# Patient Record
Sex: MALE | Race: WHITE | NOT HISPANIC OR LATINO | URBAN - METROPOLITAN AREA
[De-identification: names, ages, dates, MRNs, and addresses within clinical notes are randomized per-mention and may not be internally consistent; named-entity substitution may affect disease eponyms.]

---

## 2017-05-12 ENCOUNTER — COMMUNICATION - HEALTHEAST (OUTPATIENT)
Dept: CARDIOLOGY | Facility: CLINIC | Age: 82
End: 2017-05-12

## 2017-05-12 DIAGNOSIS — I34.1 MITRAL PROLAPSE: ICD-10-CM

## 2017-05-22 ENCOUNTER — OFFICE VISIT - HEALTHEAST (OUTPATIENT)
Dept: FAMILY MEDICINE | Facility: CLINIC | Age: 82
End: 2017-05-22

## 2017-05-22 DIAGNOSIS — A31.9 MYCOBACTERIUM INFECTIONS, ATYPICAL: ICD-10-CM

## 2017-05-22 DIAGNOSIS — R04.2 HEMOPTYSIS: ICD-10-CM

## 2017-05-22 DIAGNOSIS — I10 HTN (HYPERTENSION): ICD-10-CM

## 2017-05-23 ENCOUNTER — HOSPITAL ENCOUNTER (OUTPATIENT)
Dept: CT IMAGING | Facility: CLINIC | Age: 82
Discharge: HOME | End: 2017-05-23
Attending: FAMILY MEDICINE

## 2017-05-23 ENCOUNTER — COMMUNICATION - HEALTHEAST (OUTPATIENT)
Dept: FAMILY MEDICINE | Facility: CLINIC | Age: 82
End: 2017-05-23

## 2017-05-23 DIAGNOSIS — R04.2 HEMOPTYSIS: ICD-10-CM

## 2017-06-07 ENCOUNTER — OFFICE VISIT - HEALTHEAST (OUTPATIENT)
Dept: CARDIOLOGY | Facility: CLINIC | Age: 82
End: 2017-06-07

## 2017-06-07 ENCOUNTER — AMBULATORY - HEALTHEAST (OUTPATIENT)
Dept: CARDIOLOGY | Facility: CLINIC | Age: 82
End: 2017-06-07

## 2017-06-07 DIAGNOSIS — I34.1 MVP (MITRAL VALVE PROLAPSE): ICD-10-CM

## 2017-06-07 DIAGNOSIS — I34.0 NON-RHEUMATIC MITRAL REGURGITATION: ICD-10-CM

## 2017-06-07 DIAGNOSIS — I49.1 RUN OF ATRIAL PREMATURE COMPLEXES: ICD-10-CM

## 2017-06-07 LAB
ATRIAL RATE - MUSE: 64 BPM
DIASTOLIC BLOOD PRESSURE - MUSE: NORMAL MMHG
INTERPRETATION ECG - MUSE: NORMAL
P AXIS - MUSE: 92 DEGREES
PR INTERVAL - MUSE: 158 MS
QRS DURATION - MUSE: 98 MS
QT - MUSE: 436 MS
QTC - MUSE: 449 MS
R AXIS - MUSE: 80 DEGREES
SYSTOLIC BLOOD PRESSURE - MUSE: NORMAL MMHG
T AXIS - MUSE: 13 DEGREES
VENTRICULAR RATE- MUSE: 64 BPM

## 2017-06-07 ASSESSMENT — MIFFLIN-ST. JEOR: SCORE: 1086.71

## 2017-06-14 ENCOUNTER — OFFICE VISIT - HEALTHEAST (OUTPATIENT)
Dept: INFECTIOUS DISEASES | Facility: CLINIC | Age: 82
End: 2017-06-14

## 2017-06-14 DIAGNOSIS — J47.9 BRONCHIECTASIS WITHOUT COMPLICATION (H): ICD-10-CM

## 2017-06-14 DIAGNOSIS — J31.0 OTHER RHINITIS: ICD-10-CM

## 2017-06-14 DIAGNOSIS — I34.0 NON-RHEUMATIC MITRAL REGURGITATION: ICD-10-CM

## 2017-06-16 ENCOUNTER — AMBULATORY - HEALTHEAST (OUTPATIENT)
Dept: LAB | Facility: CLINIC | Age: 82
End: 2017-06-16

## 2017-06-16 ENCOUNTER — HOSPITAL ENCOUNTER (OUTPATIENT)
Dept: CARDIOLOGY | Facility: CLINIC | Age: 82
Discharge: HOME | End: 2017-06-16
Attending: INTERNAL MEDICINE

## 2017-06-16 DIAGNOSIS — I34.1 MVP (MITRAL VALVE PROLAPSE): ICD-10-CM

## 2017-06-16 DIAGNOSIS — J47.9 BRONCHIECTASIS WITHOUT COMPLICATION (H): ICD-10-CM

## 2017-06-16 DIAGNOSIS — I34.0 NON-RHEUMATIC MITRAL REGURGITATION: ICD-10-CM

## 2017-06-16 LAB
AORTIC ROOT: 3.5 CM
AORTIC VALVE MEAN VELOCITY: 123 CM/S
AV DIMENSIONLESS INDEX VTI: 0.3
AV MEAN GRADIENT: 8 MMHG
AV PEAK GRADIENT: 13.7 MMHG
AV VALVE AREA: 1.2 CM2
AV VELOCITY RATIO: 0.4
BSA FOR ECHO PROCEDURE: 1.54 M2
CV BLOOD PRESSURE: NORMAL MMHG
CV ECHO HEIGHT: 66 IN
CV ECHO WEIGHT: 113 LBS
DOP CALC AO PEAK VEL: 185 CM/S
DOP CALC AO VTI: 44.8 CM
DOP CALC LVOT AREA: 3.8 CM2
DOP CALC LVOT DIAMETER: 2.2 CM
DOP CALC LVOT PEAK VEL: 65.2 CM/S
DOP CALC LVOT STROKE VOLUME: 51.7 CM3
DOP CALCLVOT PEAK VEL VTI: 13.6 CM
EJECTION FRACTION: 51 % (ref 55–75)
FRACTIONAL SHORTENING: 30.9 % (ref 28–44)
INTERVENTRICULAR SEPTUM IN END DIASTOLE: 1.53 CM (ref 0.6–1)
IVS/PW RATIO: 1.1
LA AREA 1: 15.9 CM2
LA AREA 2: 20.1 CM2
LEFT ATRIUM LENGTH: 4.9 CM
LEFT ATRIUM SIZE: 3.2 CM
LEFT ATRIUM VOLUME INDEX: 36 ML/M2
LEFT ATRIUM VOLUME: 55.4 CM3
LEFT VENTRICLE CARDIAC INDEX: 2.3 L/MIN/M2
LEFT VENTRICLE CARDIAC OUTPUT: 3.5 L/MIN
LEFT VENTRICLE DIASTOLIC VOLUME INDEX: 33.1 CM3/M2 (ref 34–74)
LEFT VENTRICLE DIASTOLIC VOLUME: 51 CM3 (ref 62–150)
LEFT VENTRICLE HEART RATE: 68 BPM
LEFT VENTRICLE MASS INDEX: 140 G/M2
LEFT VENTRICLE SYSTOLIC VOLUME INDEX: 16.2 CM3/M2 (ref 11–31)
LEFT VENTRICLE SYSTOLIC VOLUME: 25 CM3 (ref 21–61)
LEFT VENTRICULAR INTERNAL DIMENSION IN DIASTOLE: 3.92 CM (ref 4.2–5.8)
LEFT VENTRICULAR INTERNAL DIMENSION IN SYSTOLE: 2.71 CM (ref 2.5–4)
LEFT VENTRICULAR MASS: 215.6 G
LEFT VENTRICULAR OUTFLOW TRACT MEAN GRADIENT: 1 MMHG
LEFT VENTRICULAR OUTFLOW TRACT MEAN VELOCITY: 38.9 CM/S
LEFT VENTRICULAR OUTFLOW TRACT PEAK GRADIENT: 2 MMHG
LEFT VENTRICULAR POSTERIOR WALL IN END DIASTOLE: 1.38 CM (ref 0.6–1)
LV STROKE VOLUME INDEX: 33.6 ML/M2
MITRAL REGURGITANT VELOCITY TIME INTEGRAL: 224 CM
MITRAL VALVE E/A RATIO: 1.5
MR MEAN GRADIENT: 93 MMHG
MR MEAN VELOCITY: 455 CM/S
MR PEAK GRADIENT: 136.9 MMHG
MR PISA RADIUS: 1.2 CM
MV AVERAGE E/E' RATIO: 17.5 CM/S
MV DECELERATION TIME: 141 MS
MV E'TISSUE VEL-LAT: 7.7 CM/S
MV E'TISSUE VEL-MED: 5.07 CM/S
MV LATERAL E/E' RATIO: 14.5
MV MEDIAL E/E' RATIO: 22.1
MV PEAK A VELOCITY: 74.9 CM/S
MV PEAK E VELOCITY: 112 CM/S
NUC REST DIASTOLIC VOLUME INDEX: 1808 LBS
NUC REST SYSTOLIC VOLUME INDEX: 66 IN
PISA MR PEAK VEL: 585 CM/S
PR MAX PG: 4 MMHG
PR PEAK VELOCITY: 119 CM/S
TRICUSPID REGURGITATION PEAK PRESSURE GRADIENT: 25.4 MMHG
TRICUSPID VALVE PEAK REGURGITANT VELOCITY: 252 CM/S

## 2017-06-16 ASSESSMENT — MIFFLIN-ST. JEOR: SCORE: 1100.31

## 2017-06-17 ENCOUNTER — COMMUNICATION - HEALTHEAST (OUTPATIENT)
Dept: SCHEDULING | Facility: CLINIC | Age: 82
End: 2017-06-17

## 2017-07-14 LAB — SPECIMEN STATUS: NORMAL

## 2017-07-21 LAB — SPECIMEN STATUS: NORMAL

## 2017-07-25 ENCOUNTER — COMMUNICATION - HEALTHEAST (OUTPATIENT)
Dept: CARDIOLOGY | Facility: CLINIC | Age: 82
End: 2017-07-25

## 2017-07-25 DIAGNOSIS — I34.1 MITRAL PROLAPSE: ICD-10-CM

## 2017-08-02 ENCOUNTER — OFFICE VISIT - HEALTHEAST (OUTPATIENT)
Dept: INFECTIOUS DISEASES | Facility: CLINIC | Age: 82
End: 2017-08-02

## 2017-08-02 DIAGNOSIS — I34.0 MITRAL VALVE INSUFFICIENCY, UNSPECIFIED ETIOLOGY: ICD-10-CM

## 2017-08-02 DIAGNOSIS — A31.0 PULMONARY MYCOBACTERIUM AVIUM COMPLEX (MAC) INFECTION (H): ICD-10-CM

## 2017-08-18 ENCOUNTER — COMMUNICATION - HEALTHEAST (OUTPATIENT)
Dept: CARDIOLOGY | Facility: CLINIC | Age: 82
End: 2017-08-18

## 2017-08-18 DIAGNOSIS — I34.0 MITRAL REGURGITATION: ICD-10-CM

## 2017-08-28 ENCOUNTER — HOSPITAL ENCOUNTER (OUTPATIENT)
Dept: CT IMAGING | Facility: CLINIC | Age: 82
Discharge: HOME | End: 2017-08-28
Attending: INTERNAL MEDICINE

## 2017-08-28 DIAGNOSIS — A31.0 PULMONARY MYCOBACTERIUM AVIUM COMPLEX (MAC) INFECTION (H): ICD-10-CM

## 2017-09-11 ENCOUNTER — AMBULATORY - HEALTHEAST (OUTPATIENT)
Dept: INFECTIOUS DISEASES | Facility: CLINIC | Age: 82
End: 2017-09-11

## 2018-02-20 ENCOUNTER — COMMUNICATION - HEALTHEAST (OUTPATIENT)
Dept: CARDIOLOGY | Facility: CLINIC | Age: 83
End: 2018-02-20

## 2018-02-20 DIAGNOSIS — I34.0 MITRAL REGURGITATION: ICD-10-CM

## 2018-02-21 ENCOUNTER — AMBULATORY - HEALTHEAST (OUTPATIENT)
Dept: CARDIOLOGY | Facility: CLINIC | Age: 83
End: 2018-02-21

## 2018-02-21 DIAGNOSIS — I34.1 MITRAL REGURGITATION DUE TO CUSP PROLAPSE: ICD-10-CM

## 2018-02-21 DIAGNOSIS — I34.0 MITRAL REGURGITATION DUE TO CUSP PROLAPSE: ICD-10-CM

## 2018-02-21 DIAGNOSIS — I34.1 MVP (MITRAL VALVE PROLAPSE): ICD-10-CM

## 2018-02-21 DIAGNOSIS — I35.0 AORTIC STENOSIS, MILD: ICD-10-CM

## 2018-03-16 ENCOUNTER — RECORDS - HEALTHEAST (OUTPATIENT)
Dept: ADMINISTRATIVE | Facility: OTHER | Age: 83
End: 2018-03-16

## 2018-05-22 ENCOUNTER — AMBULATORY - HEALTHEAST (OUTPATIENT)
Dept: CARDIOLOGY | Facility: CLINIC | Age: 83
End: 2018-05-22

## 2018-05-22 ENCOUNTER — RECORDS - HEALTHEAST (OUTPATIENT)
Dept: ADMINISTRATIVE | Facility: OTHER | Age: 83
End: 2018-05-22

## 2018-06-01 ENCOUNTER — OFFICE VISIT - HEALTHEAST (OUTPATIENT)
Dept: CARDIOLOGY | Facility: CLINIC | Age: 83
End: 2018-06-01

## 2018-06-01 DIAGNOSIS — I34.1 MVP (MITRAL VALVE PROLAPSE): ICD-10-CM

## 2018-06-01 DIAGNOSIS — I49.1 PAC (PREMATURE ATRIAL CONTRACTION): ICD-10-CM

## 2018-06-01 DIAGNOSIS — I10 ESSENTIAL HYPERTENSION: ICD-10-CM

## 2018-06-01 DIAGNOSIS — I34.0 NON-RHEUMATIC MITRAL REGURGITATION: ICD-10-CM

## 2018-06-01 DIAGNOSIS — I34.1 MITRAL PROLAPSE: ICD-10-CM

## 2018-06-01 ASSESSMENT — MIFFLIN-ST. JEOR: SCORE: 1081.26

## 2019-01-01 ENCOUNTER — RECORDS - HEALTHEAST (OUTPATIENT)
Dept: LAB | Facility: CLINIC | Age: 84
End: 2019-01-01

## 2019-01-01 ENCOUNTER — OFFICE VISIT - HEALTHEAST (OUTPATIENT)
Dept: GERIATRICS | Facility: CLINIC | Age: 84
End: 2019-01-01

## 2019-01-01 ENCOUNTER — COMMUNICATION - HEALTHEAST (OUTPATIENT)
Dept: INFECTIOUS DISEASES | Facility: CLINIC | Age: 84
End: 2019-01-01

## 2019-01-01 ENCOUNTER — RECORDS - HEALTHEAST (OUTPATIENT)
Dept: ADMINISTRATIVE | Facility: OTHER | Age: 84
End: 2019-01-01

## 2019-01-01 ENCOUNTER — COMMUNICATION - HEALTHEAST (OUTPATIENT)
Dept: TELEHEALTH | Facility: CLINIC | Age: 84
End: 2019-01-01

## 2019-01-01 ENCOUNTER — AMBULATORY - HEALTHEAST (OUTPATIENT)
Dept: GERIATRICS | Facility: CLINIC | Age: 84
End: 2019-01-01

## 2019-01-01 ENCOUNTER — COMMUNICATION - HEALTHEAST (OUTPATIENT)
Dept: GERIATRICS | Facility: CLINIC | Age: 84
End: 2019-01-01

## 2019-01-01 ENCOUNTER — AMBULATORY - HEALTHEAST (OUTPATIENT)
Dept: CARDIOLOGY | Facility: CLINIC | Age: 84
End: 2019-01-01

## 2019-01-01 DIAGNOSIS — R63.0 ANOREXIA: ICD-10-CM

## 2019-01-01 DIAGNOSIS — I35.0 AORTIC STENOSIS, MILD: ICD-10-CM

## 2019-01-01 DIAGNOSIS — M79.18 MUSCULOSKELETAL PAIN: ICD-10-CM

## 2019-01-01 DIAGNOSIS — R63.4 WEIGHT LOSS: ICD-10-CM

## 2019-01-01 DIAGNOSIS — I34.1 MVP (MITRAL VALVE PROLAPSE): ICD-10-CM

## 2019-01-01 DIAGNOSIS — E53.8 VITAMIN B12 DEFICIENCY: ICD-10-CM

## 2019-01-01 DIAGNOSIS — A31.0 MAI (MYCOBACTERIUM AVIUM-INTRACELLULARE) (H): ICD-10-CM

## 2019-01-01 DIAGNOSIS — E86.1 HYPOTENSION DUE TO HYPOVOLEMIA: ICD-10-CM

## 2019-01-01 DIAGNOSIS — J47.1 BRONCHIECTASIS WITH ACUTE EXACERBATION (H): ICD-10-CM

## 2019-01-01 DIAGNOSIS — I10 ESSENTIAL HYPERTENSION: ICD-10-CM

## 2019-01-01 DIAGNOSIS — E44.0 MODERATE PROTEIN-CALORIE MALNUTRITION (H): ICD-10-CM

## 2019-01-01 DIAGNOSIS — I34.0 MITRAL VALVE INSUFFICIENCY, UNSPECIFIED ETIOLOGY: ICD-10-CM

## 2019-01-01 DIAGNOSIS — J47.1 BRONCHIECTASIS WITH (ACUTE) EXACERBATION (H): ICD-10-CM

## 2019-01-01 DIAGNOSIS — R53.1 WEAKNESS: ICD-10-CM

## 2019-01-01 DIAGNOSIS — R41.89 COGNITIVE IMPAIRMENT: ICD-10-CM

## 2019-01-01 DIAGNOSIS — J18.9 PNEUMONIA OF BOTH UPPER LOBES DUE TO INFECTIOUS ORGANISM: ICD-10-CM

## 2019-01-01 DIAGNOSIS — N32.81 OVERACTIVE BLADDER: ICD-10-CM

## 2019-01-01 DIAGNOSIS — J96.21 ACUTE AND CHRONIC RESPIRATORY FAILURE WITH HYPOXIA (H): ICD-10-CM

## 2019-01-01 DIAGNOSIS — R53.1 GENERAL WEAKNESS: ICD-10-CM

## 2019-01-01 DIAGNOSIS — M25.512 ACUTE PAIN OF LEFT SHOULDER: ICD-10-CM

## 2019-01-01 DIAGNOSIS — E86.0 DEHYDRATION: ICD-10-CM

## 2019-01-01 LAB
ALBUMIN UR-MCNC: NEGATIVE MG/DL
ALBUMIN UR-MCNC: NEGATIVE MG/DL
ANION GAP SERPL CALCULATED.3IONS-SCNC: 5 MMOL/L (ref 5–18)
APPEARANCE UR: CLEAR
APPEARANCE UR: CLEAR
BACTERIA SPEC CULT: NO GROWTH
BACTERIA SPEC CULT: NO GROWTH
BASOPHILS # BLD AUTO: 0 THOU/UL (ref 0–0.2)
BASOPHILS NFR BLD AUTO: 0 % (ref 0–2)
BILIRUB UR QL STRIP: NEGATIVE
BILIRUB UR QL STRIP: NEGATIVE
BUN SERPL-MCNC: 17 MG/DL (ref 8–28)
CALCIUM SERPL-MCNC: 8.8 MG/DL (ref 8.5–10.5)
CHLORIDE BLD-SCNC: 103 MMOL/L (ref 98–107)
CO2 SERPL-SCNC: 28 MMOL/L (ref 22–31)
COLOR UR AUTO: YELLOW
COLOR UR AUTO: YELLOW
CREAT SERPL-MCNC: 0.68 MG/DL (ref 0.7–1.3)
EOSINOPHIL # BLD AUTO: 0.3 THOU/UL (ref 0–0.4)
EOSINOPHIL NFR BLD AUTO: 4 % (ref 0–6)
ERYTHROCYTE [DISTWIDTH] IN BLOOD BY AUTOMATED COUNT: 15.6 % (ref 11–14.5)
GFR SERPL CREATININE-BSD FRML MDRD: >60 ML/MIN/1.73M2
GLUCOSE BLD-MCNC: 75 MG/DL (ref 70–125)
GLUCOSE UR STRIP-MCNC: NEGATIVE MG/DL
GLUCOSE UR STRIP-MCNC: NEGATIVE MG/DL
HCT VFR BLD AUTO: 33.9 % (ref 40–54)
HGB BLD-MCNC: 10.7 G/DL (ref 14–18)
HGB BLD-MCNC: 13.4 G/DL (ref 14–18)
HGB UR QL STRIP: NEGATIVE
HGB UR QL STRIP: NEGATIVE
KETONES UR STRIP-MCNC: NEGATIVE MG/DL
KETONES UR STRIP-MCNC: NEGATIVE MG/DL
LEUKOCYTE ESTERASE UR QL STRIP: NEGATIVE
LEUKOCYTE ESTERASE UR QL STRIP: NEGATIVE
LYMPHOCYTES # BLD AUTO: 1.2 THOU/UL (ref 0.8–4.4)
LYMPHOCYTES NFR BLD AUTO: 15 % (ref 20–40)
MCH RBC QN AUTO: 28.7 PG (ref 27–34)
MCHC RBC AUTO-ENTMCNC: 31.6 G/DL (ref 32–36)
MCV RBC AUTO: 91 FL (ref 80–100)
MONOCYTES # BLD AUTO: 0.6 THOU/UL (ref 0–0.9)
MONOCYTES NFR BLD AUTO: 8 % (ref 2–10)
NEUTROPHILS # BLD AUTO: 5.9 THOU/UL (ref 2–7.7)
NEUTROPHILS NFR BLD AUTO: 73 % (ref 50–70)
NITRATE UR QL: NEGATIVE
NITRATE UR QL: NEGATIVE
PH UR STRIP: 6.5 [PH] (ref 4.5–8)
PH UR STRIP: 7 [PH] (ref 4.5–8)
PLATELET # BLD AUTO: 239 THOU/UL (ref 140–440)
PMV BLD AUTO: 9.5 FL (ref 8.5–12.5)
POTASSIUM BLD-SCNC: 4.2 MMOL/L (ref 3.5–5)
PSA SERPL-MCNC: <0.1 NG/ML (ref 0–6.5)
RBC # BLD AUTO: 3.73 MILL/UL (ref 4.4–6.2)
SODIUM SERPL-SCNC: 136 MMOL/L (ref 136–145)
SP GR UR STRIP: 1.01 (ref 1–1.03)
SP GR UR STRIP: 1.02 (ref 1–1.03)
UROBILINOGEN UR STRIP-ACNC: NORMAL
UROBILINOGEN UR STRIP-ACNC: NORMAL
VIT B12 SERPL-MCNC: 657 PG/ML (ref 213–816)
WBC: 6.2 THOU/UL (ref 4–11)
WBC: 8.2 THOU/UL (ref 4–11)

## 2019-02-15 ENCOUNTER — RECORDS - HEALTHEAST (OUTPATIENT)
Dept: LAB | Facility: CLINIC | Age: 84
End: 2019-02-15

## 2019-02-15 LAB
ANION GAP SERPL CALCULATED.3IONS-SCNC: 8 MMOL/L (ref 5–18)
BUN SERPL-MCNC: 28 MG/DL (ref 8–28)
CALCIUM SERPL-MCNC: 9.3 MG/DL (ref 8.5–10.5)
CHLORIDE BLD-SCNC: 107 MMOL/L (ref 98–107)
CHOLEST SERPL-MCNC: 182 MG/DL
CO2 SERPL-SCNC: 28 MMOL/L (ref 22–31)
CREAT SERPL-MCNC: 0.82 MG/DL (ref 0.7–1.3)
FASTING STATUS PATIENT QL REPORTED: NO
GFR SERPL CREATININE-BSD FRML MDRD: >60 ML/MIN/1.73M2
GLUCOSE BLD-MCNC: 90 MG/DL (ref 70–125)
HDLC SERPL-MCNC: 73 MG/DL
LDLC SERPL CALC-MCNC: 96 MG/DL
POTASSIUM BLD-SCNC: 4.8 MMOL/L (ref 3.5–5)
SODIUM SERPL-SCNC: 143 MMOL/L (ref 136–145)
TRIGL SERPL-MCNC: 65 MG/DL
VIT B12 SERPL-MCNC: 546 PG/ML (ref 213–816)

## 2020-01-01 ENCOUNTER — HOME CARE/HOSPICE - HEALTHEAST (OUTPATIENT)
Dept: HOSPICE | Facility: HOSPICE | Age: 85
End: 2020-01-01

## 2020-01-01 ENCOUNTER — OFFICE VISIT - HEALTHEAST (OUTPATIENT)
Dept: GERIATRICS | Facility: CLINIC | Age: 85
End: 2020-01-01

## 2020-01-01 ENCOUNTER — COMMUNICATION - HEALTHEAST (OUTPATIENT)
Dept: GERIATRICS | Facility: CLINIC | Age: 85
End: 2020-01-01

## 2020-01-01 ENCOUNTER — AMBULATORY - HEALTHEAST (OUTPATIENT)
Dept: HOSPICE | Facility: HOSPICE | Age: 85
End: 2020-01-01

## 2020-01-01 DIAGNOSIS — A31.0 MAI (MYCOBACTERIUM AVIUM-INTRACELLULARE) (H): ICD-10-CM

## 2020-01-01 DIAGNOSIS — I34.1 MVP (MITRAL VALVE PROLAPSE): ICD-10-CM

## 2020-01-01 DIAGNOSIS — I34.0 MITRAL VALVE INSUFFICIENCY, UNSPECIFIED ETIOLOGY: ICD-10-CM

## 2020-01-01 DIAGNOSIS — L89.151 PRESSURE INJURY OF COCCYGEAL REGION, STAGE 1: ICD-10-CM

## 2020-01-01 DIAGNOSIS — J47.1 BRONCHIECTASIS WITH ACUTE EXACERBATION (H): ICD-10-CM

## 2020-01-01 DIAGNOSIS — E86.1 HYPOTENSION DUE TO HYPOVOLEMIA: ICD-10-CM

## 2020-01-01 DIAGNOSIS — E43 SEVERE PROTEIN-CALORIE MALNUTRITION (GOMEZ: LESS THAN 60% OF STANDARD WEIGHT) (H): ICD-10-CM

## 2020-01-01 DIAGNOSIS — A08.4 VIRAL GASTROENTERITIS: ICD-10-CM

## 2020-01-01 DIAGNOSIS — M79.18 MUSCULOSKELETAL PAIN: ICD-10-CM

## 2020-01-01 DIAGNOSIS — S56.219A: ICD-10-CM

## 2020-01-01 DIAGNOSIS — M53.3 COCCYX PAIN: ICD-10-CM

## 2020-01-01 DIAGNOSIS — S61.412A SKIN TEAR OF LEFT HAND WITHOUT COMPLICATION, INITIAL ENCOUNTER: ICD-10-CM

## 2020-01-01 DIAGNOSIS — R41.89 COGNITIVE DECLINE: ICD-10-CM
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
## 2020-01-01 DIAGNOSIS — G30.1 LATE ONSET ALZHEIMER'S DISEASE WITHOUT BEHAVIORAL DISTURBANCE (H): ICD-10-CM

## 2020-01-01 DIAGNOSIS — I10 ESSENTIAL HYPERTENSION: ICD-10-CM

## 2020-01-01 DIAGNOSIS — R63.0 ANOREXIA: ICD-10-CM

## 2020-01-01 DIAGNOSIS — F02.80 LATE ONSET ALZHEIMER'S DISEASE WITHOUT BEHAVIORAL DISTURBANCE (H): ICD-10-CM

## 2020-01-01 DIAGNOSIS — S01.81XA FACIAL LACERATION, INITIAL ENCOUNTER: ICD-10-CM

## 2020-01-01 DIAGNOSIS — T30.0 FRICTION BURN: ICD-10-CM

## 2020-01-01 DIAGNOSIS — I35.0 AORTIC STENOSIS, MILD: ICD-10-CM

## 2020-01-01 DIAGNOSIS — R63.4 WEIGHT LOSS: ICD-10-CM

## 2020-01-01 DIAGNOSIS — E53.8 VITAMIN B12 DEFICIENCY: ICD-10-CM

## 2020-01-01 DIAGNOSIS — E86.0 DEHYDRATION: ICD-10-CM

## 2020-01-01 DIAGNOSIS — E44.0 MODERATE PROTEIN-CALORIE MALNUTRITION (H): ICD-10-CM

## 2020-01-01 DIAGNOSIS — N32.81 OVERACTIVE BLADDER: ICD-10-CM

## 2020-01-01 DIAGNOSIS — I34.1 MITRAL PROLAPSE: ICD-10-CM

## 2020-01-01 RX ORDER — LORAZEPAM 0.5 MG/1
0.5 TABLET ORAL EVERY 4 HOURS PRN
Status: SHIPPED | COMMUNITY
Start: 2020-01-01

## 2020-01-01 RX ORDER — HALOPERIDOL 2 MG/ML
1 SOLUTION ORAL EVERY 4 HOURS
Status: SHIPPED | COMMUNITY
Start: 2020-01-01

## 2020-01-01 RX ORDER — LORAZEPAM 2 MG/ML
0.5 CONCENTRATE ORAL EVERY 4 HOURS PRN
Status: SHIPPED | COMMUNITY
Start: 2020-01-01

## 2020-01-01 RX ORDER — BISACODYL 10 MG
10 SUPPOSITORY, RECTAL RECTAL DAILY PRN
Status: SHIPPED | COMMUNITY
Start: 2020-01-01

## 2020-01-01 RX ORDER — HYDROMORPHONE HYDROCHLORIDE 1 MG/ML
2 SOLUTION ORAL EVERY 4 HOURS
Status: SHIPPED | COMMUNITY
Start: 2020-01-01

## 2020-01-01 RX ORDER — HALOPERIDOL 2 MG/ML
2 SOLUTION ORAL
Status: SHIPPED | COMMUNITY
Start: 2020-01-01

## 2020-01-01 RX ORDER — HALOPERIDOL 2 MG/ML
1 SOLUTION ORAL
Status: SHIPPED | COMMUNITY
Start: 2020-01-01

## 2020-01-01 RX ORDER — ATROPINE SULFATE 10 MG/ML
2 SOLUTION/ DROPS OPHTHALMIC EVERY 4 HOURS PRN
Status: SHIPPED | COMMUNITY
Start: 2020-01-01

## 2020-01-01 RX ORDER — HYDROMORPHONE HYDROCHLORIDE 1 MG/ML
2 SOLUTION ORAL
Status: SHIPPED | COMMUNITY
Start: 2020-01-01

## 2020-01-01 RX ORDER — AMOXICILLIN 250 MG
1 CAPSULE ORAL DAILY
Status: SHIPPED | COMMUNITY
Start: 2020-01-01

## 2020-06-29 ENCOUNTER — HOME CARE/HOSPICE - HEALTHEAST (OUTPATIENT)
Dept: HOSPICE | Facility: HOSPICE | Age: 85
End: 2020-06-29

## 2020-07-29 ENCOUNTER — AMBULATORY - HEALTHEAST (OUTPATIENT)
Dept: GERIATRICS | Facility: CLINIC | Age: 85
End: 2020-07-29

## 2021-05-26 VITALS — OXYGEN SATURATION: 90 % | RESPIRATION RATE: 20 BRPM | HEART RATE: 56 BPM

## 2021-05-26 VITALS
OXYGEN SATURATION: 93 % | DIASTOLIC BLOOD PRESSURE: 62 MMHG | SYSTOLIC BLOOD PRESSURE: 98 MMHG | RESPIRATION RATE: 24 BRPM | HEART RATE: 72 BPM

## 2021-05-26 VITALS
HEART RATE: 66 BPM | DIASTOLIC BLOOD PRESSURE: 74 MMHG | RESPIRATION RATE: 16 BRPM | SYSTOLIC BLOOD PRESSURE: 109 MMHG | OXYGEN SATURATION: 94 %

## 2021-05-26 VITALS
DIASTOLIC BLOOD PRESSURE: 59 MMHG | OXYGEN SATURATION: 91 % | RESPIRATION RATE: 18 BRPM | SYSTOLIC BLOOD PRESSURE: 98 MMHG | HEART RATE: 69 BPM

## 2021-05-26 VITALS
TEMPERATURE: 96.5 F | RESPIRATION RATE: 16 BRPM | SYSTOLIC BLOOD PRESSURE: 107 MMHG | HEART RATE: 56 BPM | OXYGEN SATURATION: 96 % | DIASTOLIC BLOOD PRESSURE: 67 MMHG

## 2021-05-26 VITALS
HEART RATE: 82 BPM | RESPIRATION RATE: 16 BRPM | TEMPERATURE: 97 F | SYSTOLIC BLOOD PRESSURE: 110 MMHG | DIASTOLIC BLOOD PRESSURE: 70 MMHG

## 2021-05-27 ENCOUNTER — RECORDS - HEALTHEAST (OUTPATIENT)
Dept: ADMINISTRATIVE | Facility: CLINIC | Age: 86
End: 2021-05-27

## 2021-05-27 VITALS
DIASTOLIC BLOOD PRESSURE: 50 MMHG | HEART RATE: 93 BPM | RESPIRATION RATE: 24 BRPM | SYSTOLIC BLOOD PRESSURE: 80 MMHG | OXYGEN SATURATION: 94 %

## 2021-05-27 VITALS
HEART RATE: 66 BPM | DIASTOLIC BLOOD PRESSURE: 63 MMHG | SYSTOLIC BLOOD PRESSURE: 103 MMHG | RESPIRATION RATE: 16 BRPM | OXYGEN SATURATION: 90 %

## 2021-05-27 VITALS
DIASTOLIC BLOOD PRESSURE: 66 MMHG | SYSTOLIC BLOOD PRESSURE: 120 MMHG | OXYGEN SATURATION: 95 % | HEART RATE: 59 BPM | RESPIRATION RATE: 13 BRPM | TEMPERATURE: 97.1 F

## 2021-05-27 VITALS — SYSTOLIC BLOOD PRESSURE: 82 MMHG | RESPIRATION RATE: 20 BRPM | DIASTOLIC BLOOD PRESSURE: 50 MMHG | HEART RATE: 108 BPM

## 2021-05-27 VITALS
HEART RATE: 88 BPM | DIASTOLIC BLOOD PRESSURE: 82 MMHG | OXYGEN SATURATION: 90 % | RESPIRATION RATE: 24 BRPM | SYSTOLIC BLOOD PRESSURE: 118 MMHG

## 2021-05-27 VITALS
DIASTOLIC BLOOD PRESSURE: 56 MMHG | OXYGEN SATURATION: 96 % | HEART RATE: 123 BPM | SYSTOLIC BLOOD PRESSURE: 92 MMHG | RESPIRATION RATE: 16 BRPM

## 2021-05-27 VITALS
RESPIRATION RATE: 16 BRPM | DIASTOLIC BLOOD PRESSURE: 70 MMHG | HEART RATE: 82 BPM | OXYGEN SATURATION: 93 % | SYSTOLIC BLOOD PRESSURE: 110 MMHG

## 2021-05-30 ENCOUNTER — RECORDS - HEALTHEAST (OUTPATIENT)
Dept: ADMINISTRATIVE | Facility: CLINIC | Age: 86
End: 2021-05-30

## 2021-05-30 NOTE — PROGRESS NOTES
[facility]    DATE: 2019    NAME:  Sonu Solomon             :  1927  MRN: [id number]  CODE STATUS:  DNR/DNI    VISIT TYPE: Discharge Summary     FACILITY:  [facility] [[id number]]       CHIEF COMPLAIN/REASON FOR VISIT:    Chief Complaint   Patient presents with     Discharge Summary               HISTORY OF PRESENT ILLNESS: Sonu Solomon is a 89 y.o. male who was admitted to [facility] 7/3- for worsening shortness of breath. CT showed debris in left mainstem bronchus, bronchi to left lower lobe, new airspace disease in both upper lobes. He has history of chronic DAVID infection/nodular bronchiectasis. He was treated for pneumonia and exacerbation. He was weaned from oxygen by discharge and was treated with azithromycin and omnicef. He had some delirium during stay and it was suspected he had underlying dementia. His slums score in hospital was 19. He had preivously been struggling at home to remember to eat and had lost a significant amoutn of weight. He was driving but would frequently get lost. He was discharged to [facility]. He transferred to [facility] on 7/10. He has PMH of HTN, chronic DAVID infection, severe mitral valve prolapse, regurgitation, hyponatremia, aortic stenosis. Prior to this he lived at home and was still driving.     TCU course:   Mr. Solomon has made progress with therapy and is ambulating 400 feet with no assistive device. He scored 39/56 on gtaes so does have safety concerns. He scored 14/30 on slums so significant cognitive impairment and 20/30 on MOCA. He completed cefdinir on  and had resolution of cough, shortness of breath at the time. His vitals were stable and no orthostasis in therapy noted. His cardiac appointment for follow up on mitral valve was cancelled and will be rescheduled after discharges from TCU. He had issues with overactive bladder symptoms and Urine culture was negative. He was  started on oxybutynin and dose increased to 10mg and now is much improved. His b12 level was stable as were labs. He did have some chest wall musculoskeletal pain that was resolved with therapy, ice, tylenol. His weights had dropped at home previously due to forgetting to eat but now he is back up to 109lbs. He will not be returning to his [facility] but will be moving to [facility]. He was recommended to stop driving. He will f/u with PCP or in house provider in one week from discharge. He will be moving over there on 7/23.       REVIEW OF SYSTEMS:  PROBLEMS AND REVIEW OF SYSTEMS:   Today on ROS:   Currently, no fever, chills, or rigors. Decreased vision, hearing intact. Denies any chest pain, headaches, palpitations, lightheadedness, dizziness, shortness of breath. Appetite is good. Denies any GERD symptoms. Denies any difficulty with swallowing, nausea, or vomiting.  Denies any abdominal pain, diarrhea or constipation. No insomnia. No active bleeding. No rash. Positive for forgetfulness, urinary frequency, nocturia, urgency, incontinence-improving    Allergies   Allergen Reactions     Hydrocodone-Acetaminophen Nausea Only     Tree And Shrub Pollen Other (See Comments)     Sneezing from evergreen tree pollen in winter.     Current Outpatient Medications   Medication Sig     acetaminophen (TYLENOL) 500 MG tablet Take 1,000 mg by mouth 3 (three) times a day as needed for pain.     aspirin 81 mg chewable tablet Chew 81 mg daily.     carvedilol (COREG) 6.25 MG tablet Take 1 tablet (6.25 mg total) by mouth 2 (two) times a day with meals.     cyanocobalamin (VITAMIN B-12) 500 MCG tablet Take 500 mcg by mouth daily.     lisinopril (PRINIVIL,ZESTRIL) 2.5 MG tablet Take 1 tablet (2.5 mg total) by mouth daily.     multivitamin with minerals (THERA-M) 9 mg iron-400 mcg Tab tablet Take 1 tablet by mouth daily.     oxybutynin (DITROPAN XL) 10 MG ER tablet Take 10 mg by mouth daily.     Past Medical  History:    Past Medical History:   Diagnosis Date     Arrhythmia     PAC's     Hypertension      Valvular disease     MVP with mod-severe MR           PHYSICAL EXAMINATION  Vitals:    07/21/19 1839   BP: 103/62   Pulse: 70   Resp: 18   Temp: 98.3  F (36.8  C)   SpO2: 93%   Weight: 109 lb (49.4 kg)       Today on physical exam:     GENERAL: Awake, Alert, oriented x3, not in any form of acute distress, answers questions appropriately, follows simple commands, conversant, small frame  HEENT: Head is normocephalic with normal hair distribution. No evidence of trauma. Ears: No acute purulent discharge. Eyes: Conjunctivae pink with no scleral jaundice. Nose: Normal mucosa and septum. NECK: Supple with no cervical or supraclavicular lymphadenopathy. Trachea is midline.   CHEST: No tenderness or deformity, no crepitus  LUNG: dim to auscultation with good chest expansion. There are no crackles or wheezes, normal AP diameter.  BACK: No kyphosis of the thoracic spine. Symmetric, no curvature, ROM normal, no CVA tenderness, no spinal tenderness   CVS: There is good S1  S2, regular rhythm, murmur present,  2+ pulses symmetric in all extremities.  ABDOMEN: concave and soft, nontender to palpation, non distended, no masses, no organomegaly, good bowel sounds, no rebound or guarding, no peritoneal signs.   EXTREMITIES: No pedal edema, Atraumatic. Full range of motion on both upper and lower extremities, there is no tenderness to palpation, no cyanosis or clubbing, no calf tenderness.  Pulses equal in all extremities, normal cap refill, no joint swelling.  SKIN: Warm and dry, no erythema noted.  Skin color, texture, no rashes or lesions.  NEUROLOGICAL: The patient is oriented to person, place and time, forgetful at times.           LABS:   Recent Results (from the past 168 hour(s))   Basic Metabolic Panel   Result Value Ref Range    Sodium 136 136 - 145 mmol/L    Potassium 4.2 3.5 - 5.0 mmol/L    Chloride 103 98 - 107 mmol/L    CO2  28 22 - 31 mmol/L    Anion Gap, Calculation 5 5 - 18 mmol/L    Glucose 75 70 - 125 mg/dL    Calcium 8.8 8.5 - 10.5 mg/dL    BUN 17 8 - 28 mg/dL    Creatinine 0.68 (L) 0.70 - 1.30 mg/dL    GFR MDRD Af Amer >60 >60 mL/min/1.73m2    GFR MDRD Non Af Amer >60 >60 mL/min/1.73m2   HM1 (CBC with Diff)   Result Value Ref Range    WBC 8.2 4.0 - 11.0 thou/uL    RBC 3.73 (L) 4.40 - 6.20 mill/uL    Hemoglobin 10.7 (L) 14.0 - 18.0 g/dL    Hematocrit 33.9 (L) 40.0 - 54.0 %    MCV 91 80 - 100 fL    MCH 28.7 27.0 - 34.0 pg    MCHC 31.6 (L) 32.0 - 36.0 g/dL    RDW 15.6 (H) 11.0 - 14.5 %    Platelets 239 140 - 440 thou/uL    MPV 9.5 8.5 - 12.5 fL    Neutrophils % 73 (H) 50 - 70 %    Lymphocytes % 15 (L) 20 - 40 %    Monocytes % 8 2 - 10 %    Eosinophils % 4 0 - 6 %    Basophils % 0 0 - 2 %    Neutrophils Absolute 5.9 2.0 - 7.7 thou/uL    Lymphocytes Absolute 1.2 0.8 - 4.4 thou/uL    Monocytes Absolute 0.6 0.0 - 0.9 thou/uL    Eosinophils Absolute 0.3 0.0 - 0.4 thou/uL    Basophils Absolute 0.0 0.0 - 0.2 thou/uL   Vitamin B12   Result Value Ref Range    Vitamin B-12 657 213 - 816 pg/mL     Results for orders placed or performed in visit on 07/19/19   Basic Metabolic Panel   Result Value Ref Range    Sodium 136 136 - 145 mmol/L    Potassium 4.2 3.5 - 5.0 mmol/L    Chloride 103 98 - 107 mmol/L    CO2 28 22 - 31 mmol/L    Anion Gap, Calculation 5 5 - 18 mmol/L    Glucose 75 70 - 125 mg/dL    Calcium 8.8 8.5 - 10.5 mg/dL    BUN 17 8 - 28 mg/dL    Creatinine 0.68 (L) 0.70 - 1.30 mg/dL    GFR MDRD Af Amer >60 >60 mL/min/1.73m2    GFR MDRD Non Af Amer >60 >60 mL/min/1.73m2         Lab Results   Component Value Date    WBC 8.2 07/19/2019    HGB 10.7 (L) 07/19/2019    HCT 33.9 (L) 07/19/2019    MCV 91 07/19/2019     07/19/2019       Lab Results   Component Value Date    XGQXYVGX23 657 07/19/2019     No results found for: HGBA1C  Lab Results   Component Value Date    INR 1.05 03/13/2018     No results found for: EMWDHQLX97KB  Lab Results    Component Value Date    TSH 2.04 05/28/2015           ASSESSMENT/PLAN:    1. Community acquired pneumonia, Acute on chronic bronchiectasis, chronic DAVID infection: completed cefdinir 7/12. resolved. F/u with ID prn only.  Mild cough today did order robitussin prn. Lungs clear.   2. HTN: SBP 100s. On coreg, lisinopril. hold parameters. No orthostasis in therapy thus far. Has been well controlled.   3. Aortic stenosis, severe mitral valve prolapse, regurgitation: On aspirin, coreg, lisinopril. Cardiac appt postponed.  F/u after discharge.   4. Overactive bladder: Reports urinary urgency, frequency, nocturia, incontinence at times. Resolved since increasing oxybutynin xr to 10mg.   5. Vitamin b 12 deficiency: On vitamin b12. Last b12 level on chart from February 545. Check level on 7/.  6. Anorexia: recent weight loss, forgetting to eat. Down to 102lbs, [phone] . Improved in facility setting.   7. Left chest wall strain, musculoskeletal pain:tylenol prn. Resolved.      Electronically signed by: Estephania Espinosa NP     Total floor/unit time spent 35 min with 25 min spent on counseling and coordination of care. Counseling regarding tcu course, med changes, specialty follow ups, primary care follow ups, bronchiectasis management, mitral prolapse management, overactive bladder management. Coordinated care with nursing, therapy,  for discharge planning, services for discharge, change in setting, primary care and specialty care follow ups.       Please evaluate Sonu Solomon for admission to Home Health.    Face to Face Attestation and Initial Plan of Care    The face-to-face encounter occurred on date: 7/22/19  Face to Face encounter was with: Estephania Espinosa    Please provide brief clinical summary of reason for visit and need for home care. Deconditioning after hospital stay for cap, bronchiectasis    Please identify which of the following home health disciplines the patient will need AND describe  "the skilled services that you would like the home health agency to perform: SKILLED NURSING (RN): complex med management, PHYSICAL THERAPY: strength training and gait training, OCCUPATIONAL THERAPY: ADLs and home safety and SPEECH LANGUAGE PATHOLOGY:other    Homebound Status (describe the functional limitations that support this patient is confined to his/her home. Medicaid recipients are not required to be homebound.):patient is homebound due to cognitive deficits    Name of physician who will be responsible for the ongoing home health plan of care (CMS requires the referring physician to provide the specific name of the community physician instead of a title, such as \"PCP\"): Arturo Ludwig MD    Requested Start of Care Date: Within 48 hours    Other information to assist the home health agency in developing the initial Plan of Care:    I certify that services are/were furnished while this patient was under the care of a physician and that a physician or an allowed non-physician practitioner (NPP), had a face-to-face encounter that meets the physician face-to-face encounter requirements. The encounter was in whole, or in part, related to the primary reason for home health. The patient is confined to his/her home and needs intermittent skilled nursing, physical therapy, speech-language pathology, or the continued need for occupational therapy. A plan of care has been established by a physician and is periodically reviewed by a physician.      "

## 2021-05-30 NOTE — PROGRESS NOTES
[facility]    DATE: 7/15/2019    NAME:  Sonu Solomon             :  1927  MRN: [id number]  CODE STATUS:  DNR/DNI    VISIT TYPE: Problem Visit (urinary frequency, urgency, incontinence)     FACILITY:  [facility] [[id number]]       CHIEF COMPLAIN/REASON FOR VISIT:    Chief Complaint   Patient presents with     Problem Visit     urinary frequency, urgency, incontinence               HISTORY OF PRESENT ILLNESS: Sonu Solomon is a 89 y.o. male who was admitted to [facility] 7/3- for worsening shortness of breath. CT showed debris in left mainstem bronchus, bronchi to left lower lobe, new airspace disease in both upper lobes. He has history of chronic DAVID infection/nodular bronchiectasis. He was treated for pneumonia and exacerbation. He was weaned from oxygen by discharge and was treated with azithromycin and omnicef. He had some delirium during stay and it was suspected he had underlying dementia. His slums score in hospital was 19. He had preivously been struggling at home to remember to eat and had lost a significant amoutn of weight. He was driving but would frequently get lost. He was discharged to [facility]. He transferred to [facility] on 7/10. He has PMH of HTN, chronic DAVID infection, severe mitral valve prolapse, regurgitation, hyponatremia, aortic stenosis. Prior to this he lived at home and was still driving.     Today Mr. Solomon is seen for follow up visit on urinary incontinence and frequency. He was reporting the same concerns over the weekend as last week to the staff. He said that he was having urinary frequency, urgency, incontinence, nocturia that was all new for him, however per his daughter's report last week this has been going on for several years. He was asking staff for the supplement he took at home to be restarted but he cannot recall the name of it. Staff report he has been getting up much less frequently  during the night to urinate than before and seems to be going in larger amounts. He was previously getting up 5-7 times per night or even every hour but now he is only getting up 2-3 times per night. Nursing today notes that his daughter mentioned something yesterday about wanting him switched to the long acting oxybutynin. On exam today he is seen in his room and reports that he got up 3 times to go to the bathroom last night. He says that he had an awful night and couldn't sleep because he had to urinate so often. He says this urinating at night is new for him and he is having to go every hour during the day. Staff report he is not urinating that often. He says he was taking something at home that fixed this for him and he could tell if he didn't take it at bedtime that he would have to go a lot more. He later admits he was getting up to go to the bathroom 2-3 times per night at home as well and has been taking something for urinating frequently for many years. He cannot recall what medicine this was but says his daughter threw it away and he wants it back. He also says he wants to go back to his home and his daughter set up a tour of the apartments in the assisted living today. He does not want to move and doesn't think he needs to. He thinks he is still safe at home and thinks he can still drive. Discussed his safety concerns, memory issues, forgetting to eat, forgetting appointments, meds, etc. He becomes upset and says he never did any of that and he is perfectly safe at home because he has a life alert system. Therapy present as well and also reiterates that there are too many safety concerns for returning home alone and recommending he consider moving to assisted living. He says he will tell his daughters today he doesn't want to move but he thinks they will tell him too bad and not listen to him. He denies any concerns with bowels, shortness of breath, cough, dizziness or other concerns. He says he walks so  well he doesn't even have to use a walker.     REVIEW OF SYSTEMS:  PROBLEMS AND REVIEW OF SYSTEMS:   Today on ROS:   Currently, no fever, chills, or rigors. Decreased vision, hearing intact. Denies any chest pain, headaches, palpitations, lightheadedness, dizziness, shortness of breath. Appetite is good. Denies any GERD symptoms. Denies any difficulty with swallowing, nausea, or vomiting.  Denies any abdominal pain, diarrhea or constipation. No insomnia. No active bleeding. No rash. Positive for forgetfulness, urinary frequency, nocturia, urgency, incontinence-appears at baseline, anxious at times      Allergies   Allergen Reactions     Hydrocodone-Acetaminophen Nausea Only     Tree And Shrub Pollen Other (See Comments)     Sneezing from evergreen tree pollen in winter.     Current Outpatient Medications   Medication Sig     oxybutynin (DITROPAN XL) 10 MG ER tablet Take 10 mg by mouth daily.     aspirin 81 mg chewable tablet Chew 81 mg daily.     carvedilol (COREG) 6.25 MG tablet Take 1 tablet (6.25 mg total) by mouth 2 (two) times a day with meals.     cyanocobalamin (VITAMIN B-12) 500 MCG tablet Take 500 mcg by mouth daily.     lisinopril (PRINIVIL,ZESTRIL) 2.5 MG tablet Take 1 tablet (2.5 mg total) by mouth daily.     multivitamin with minerals (THERA-M) 9 mg iron-400 mcg Tab tablet Take 1 tablet by mouth daily.     Past Medical History:    Past Medical History:   Diagnosis Date     Arrhythmia     PAC's     Hypertension      Valvular disease     MVP with mod-severe MR           PHYSICAL EXAMINATION  Vitals:    07/14/19 2111   BP: 123/73   Pulse: 66   Resp: 18   Temp: 96.8  F (36  C)   SpO2: 92%       Today on physical exam:     GENERAL: Awake, Alert, oriented x3, not in any form of acute distress, answers questions appropriately, follows simple commands, conversant, small frame  HEENT: Head is normocephalic with normal hair distribution. No evidence of trauma. Ears: No acute purulent discharge. Eyes: Conjunctivae  pink with no scleral jaundice. Nose: Normal mucosa and septum. NECK: Supple with no cervical or supraclavicular lymphadenopathy. Trachea is midline.   CHEST: No tenderness or deformity, no crepitus  LUNG: dim to auscultation with good chest expansion. There are no crackles or wheezes, normal AP diameter.  BACK: No kyphosis of the thoracic spine. Symmetric, no curvature, ROM normal, no CVA tenderness, no spinal tenderness   CVS: There is good S1  S2, regular rhythm, murmur present,  2+ pulses symmetric in all extremities.  ABDOMEN: concave and soft, nontender to palpation, non distended, no masses, no organomegaly, good bowel sounds, no rebound or guarding, no peritoneal signs.   EXTREMITIES: No pedal edema, Atraumatic. Full range of motion on both upper and lower extremities, there is no tenderness to palpation, no cyanosis or clubbing, no calf tenderness.  Pulses equal in all extremities, normal cap refill, no joint swelling.  SKIN: Warm and dry, no erythema noted.  Skin color, texture, no rashes or lesions.  NEUROLOGICAL: The patient is oriented to person, place and time, forgetful at times.           LABS:   Recent Results (from the past 168 hour(s))   Urinalysis   Result Value Ref Range    Color, UA Yellow Colorless, Yellow, Straw, Light Yellow    Clarity, UA Clear Clear    Glucose, UA Negative Negative    Bilirubin, UA Negative Negative    Ketones, UA Negative Negative    Specific Gravity, UA 1.018 1.001 - 1.030    Blood, UA Negative Negative    pH, UA 6.5 4.5 - 8.0    Protein, UA Negative Negative mg/dL    Urobilinogen, UA <2.0 E.U./dL <2.0 E.U./dL, 2.0 E.U./dL    Nitrite, UA Negative Negative    Leukocytes, UA Negative Negative   Culture, Urine   Result Value Ref Range    Culture No Growth    White Blood Count (WBC)   Result Value Ref Range    WBC 6.2 4.0 - 11.0 thou/uL     Results for orders placed or performed during the hospital encounter of 07/03/19   Basic Metabolic Panel   Result Value Ref Range     Sodium 143 136 - 145 mmol/L    Potassium 4.0 3.5 - 5.0 mmol/L    Chloride 108 (H) 98 - 107 mmol/L    CO2 30 22 - 31 mmol/L    Anion Gap, Calculation 5 5 - 18 mmol/L    Glucose 85 70 - 125 mg/dL    Calcium 8.6 8.5 - 10.5 mg/dL    BUN 24 8 - 28 mg/dL    Creatinine 0.72 0.70 - 1.30 mg/dL    GFR MDRD Af Amer >60 >60 mL/min/1.73m2    GFR MDRD Non Af Amer >60 >60 mL/min/1.73m2         Lab Results   Component Value Date    WBC 6.2 07/09/2019    HGB 11.6 (L) 07/04/2019    HCT 37.2 (L) 07/04/2019    MCV 93 07/04/2019     07/04/2019       Lab Results   Component Value Date    [id number] 546 02/15/2019     No results found for: HGBA1C  Lab Results   Component Value Date    INR 1.05 03/13/2018     No results found for: [id number]  Lab Results   Component Value Date    TSH 2.04 05/28/2015           ASSESSMENT/PLAN:    1. Community acquired pneumonia, Acute on chronic bronchiectasis, chronic DAVID infection: completed cefdinir 7/12. Cough resolved. No shortness of breath. No hypoxia, fevers, chills. Much improved. F/u with ID prn only. No concerns today.   2. HTN: SBP 120s. On coreg, lisinopril. hold parameters. No orthostasis in therapy thus far.   3. Aortic stenosis, severe mitral valve prolapse, regurgitation: On aspirin, coreg, lisinopril. Cardiac appt postponed.    4. Overactive bladder: Reports urinary urgency, frequency, nocturia, incontinence at times. Long term problem, very forgetful and does not recall this is not new for him. Has been on oxybutynin and over the counter meds in past. recent Urine culture no growth on 7/9. staff report improvement in number of times and amounts urinating since starting oxybutynin, will change to long acting. Was increased to two times a day last week but staff ordered to increase to 10mg at night. Will keep and just change to long acting at this time.    5. Vitamin b 12 deficiency: On vitamin b12. Last b12 level on chart from February 545. Will consider rechecking with next lab  draw.   6. Anorexia: recent weight loss, forgetting to eat. Down to 102lbs, 108-105lbs . Will monitor 3 times weekly.     Labs recently done 7/4 and 7/9 stable    Per therapy PT- SBA/S t/f's , 400 ft SBA no AD, RAIMUNDO 39/56 - OT - ADL - SBA, safety 15/20,  SLUMS 14/30 ST - 20/30 MOCA  appears inflated based upon observation of completing tasks. Green tag. LCD 7/22. Lives in [facility] - would like to move to [facility]. CHIDI camilo PT, OT, HHA.     Electronically signed by: Estephania Espinosa NP

## 2021-05-30 NOTE — PROGRESS NOTES
[facility]      Facility:    [facility] [[id number]]  Code Status: DNR      Chief Complaint/Reason for Visit:  Chief Complaint   Patient presents with     H & P     Chronic M AI infection/nodular bronchiectasis with exacerbation.  Acute hypoxic respiratory failure weaned off oxygen, hypertension, severe mitral valve prolapse regurg, possible cognitive impairment with family concerns about dementia with mild delirium.       HPI:   Sonu is a 89 y.o. male who was recently admitted to the hospital on 7/3/2019.  He did have community-acquired pneumonia with bronchiectasis and DAVID infection which is chronic.  He did have acute respiratory failure and he had not been on oxygen at home he was put on oxygen and he was placed on antibiotics.  He was put on Ceftin ear at this time and did finish his azithromycin.  Overall his condition improved and was weaned off the oxygen did feel better.  He does have high blood pressure and continue his Coreg 2 times a day as well as lisinopril his blood pressure was well controlled at this time.  He does have mitral valve and prolapse with regurg but is been stable at this time.  He was treated probably and transferred here to the TCU at [place] in stable condition to finish off his antibiotics and undergo physical and occupational therapy as well as some cognitive testing.    Patient did have some mild delirium on admission the concern was about some dementia.  His slums was 19 at this time and there was concern of his current living conditions.  He had lost some weight and is still driving at this time.    Patient is up in chair and doing pretty well at this time.  He still does have a cough and his main concerns today is frequency with urine.  He has been on an over-the-counter medication for some time and they did start Detrol in the hospital but this does not seem to be working.  He has had prostate cancer in the past which is  concerning and he does have urgency also.  He has no fevers chills nausea or vomiting.    Past Medical History:  Past Medical History:   Diagnosis Date     Arrhythmia     PAC's     Hypertension      Valvular disease     MVP with mod-severe MR           Surgical History:  Past Surgical History:   Procedure Laterality Date     APPENDECTOMY Right      INCISIONAL HERNIA REPAIR Left 05/23/2008    Surgeon Dr. Brandon at [facility].       Family History:   History reviewed. No pertinent family history.    Social History:    Social History     Socioeconomic History     Marital status:      Spouse name: None     Number of children: None     Years of education: None     Highest education level: None   Occupational History     None   Social Needs     Financial resource strain: None     Food insecurity:     Worry: None     Inability: None     Transportation needs:     Medical: None     Non-medical: None   Tobacco Use     Smoking status: Former Smoker     Types: Pipe     Smokeless tobacco: Never Used   Substance and Sexual Activity     Alcohol use: None     Drug use: No     Sexual activity: None   Lifestyle     Physical activity:     Days per week: None     Minutes per session: None     Stress: None   Relationships     Social connections:     Talks on phone: None     Gets together: None     Attends Jewish service: None     Active member of club or organization: None     Attends meetings of clubs or organizations: None     Relationship status: None     Intimate partner violence:     Fear of current or ex partner: None     Emotionally abused: None     Physically abused: None     Forced sexual activity: None   Other Topics Concern     None   Social History Narrative     None          Review of Systems   Constitutional:        Review of systems is positive for urgency frequency without burning and suprapubic discomfort.  He is incontinent of urine at this time but not of stool.  He denies any fevers chills nausea vomiting  diarrhea change in vision hearing taste or smell weakness one side the other chest pain shortness of breath depression or anxiety.  He is actually doing well with his good quality of life and looking at him he does appear younger than his stated age.       Vitals:    07/09/19 0901   BP: 109/67   Pulse: 64   Resp: 16   Temp: (!) 96.1  F (35.6  C)   SpO2: 96%       Physical Exam   Constitutional: He appears well-nourished. No distress.   HENT:   Head: Normocephalic and atraumatic.   Nose: Nose normal.   Mouth/Throat: No oropharyngeal exudate.   Eyes: Conjunctivae are normal. Right eye exhibits no discharge. Left eye exhibits no discharge.   Neck: Neck supple. No thyromegaly present.   Cardiovascular: Normal rate and regular rhythm. Exam reveals no gallop and no friction rub.   Murmur heard.  Pulmonary/Chest: Effort normal and breath sounds normal. No respiratory distress. He has no wheezes. He has no rales.   Abdominal: Soft. Bowel sounds are normal. He exhibits no distension.   Patient did have some tenderness over the suprapubic area.  No rebound or guarding and upper quadrant his abdomen and lower quadrant of his abdomen with the exception of the pubic symphysis tenderness did not reveal any tenderness or rebound or guarding.   Musculoskeletal: He exhibits no edema or tenderness.   Lymphadenopathy:     He has no cervical adenopathy.   Neurological: He is alert. No cranial nerve deficit. He exhibits normal muscle tone.   Skin: Skin is warm and dry. He is not diaphoretic.   Psychiatric: He has a normal mood and affect. His behavior is normal.       Medication List:  Current Outpatient Medications   Medication Sig     aspirin 81 mg chewable tablet Chew 81 mg daily.     carvedilol (COREG) 6.25 MG tablet Take 1 tablet (6.25 mg total) by mouth 2 (two) times a day with meals.     cefdinir (OMNICEF) 300 MG capsule Take 1 capsule (300 mg total) by mouth 2 times a day at 6:00 am and 4:00 pm for 6 days.     cyanocobalamin  (VITAMIN B-12) 500 MCG tablet Take 500 mcg by mouth daily.     lisinopril (PRINIVIL,ZESTRIL) 2.5 MG tablet Take 1 tablet (2.5 mg total) by mouth daily.     multivitamin with minerals (THERA-M) 9 mg iron-400 mcg Tab tablet Take 1 tablet by mouth daily.     oxybutynin (DITROPAN) 5 MG tablet Take 5 mg by mouth daily as needed.       Labs: Laboratory values from 7/4/2019 was follows sputum culture was negative for Acid-fast bacilli, white count was 10.1, hemoglobin 11.6, platelets of 215,000.  Sodium is 143, potassium 4.0, CO2 is 30, calcium is 8.6, BUN was 24, creatinine 0.72, GFR is green 60, troponin 0 0.06.      Assessment:    ICD-10-CM    1. Bronchiectasis with (acute) exacerbation (H) J47.1    2. Acute and chronic respiratory failure with hypoxia (H) J96.21    3. Essential hypertension I10    4. Cognitive impairment R41.89        Plan: Plan at this time I will get a UA UC stat second urgency frequency and suprapubic tenderness that I did find on physical exam.  White count be done today secondary infection and will check postvoid residuals x3 days.  I did explain the patient with his daughter in the room the diagram of outflow obstruction possible as well as the possible with inflammation due to urinary tract infection could be causing this.  Speech therapy will evaluate and treat for cognition and will continue with physical and Occupational Therapy.  His lungs are clear but he still has a cough which is expected secondary to his MAC and we will continue to monitor above medical problems.  58 minutes was spent on this admission with greater than 50% of the time dedicated to coordination care discussing case with patient's daughter and him in the room face-to-face as well as formulating plan of care which all are in agreement at this time.    Follow-up tomorrow should include attention to the UA UC as well as the white count and checking with nurses on post void residuals.        Electronically signed by: Reyes RAHMAN  DO Kaley

## 2021-05-30 NOTE — PROGRESS NOTES
[facility]    Facility:   [facility] [[id number]]   Code Status: DNR  PCP: Arturo Ludwig MD   Phone: [phone]   Fax: [phone]      CHIEF COMPLAINT/REASON FOR VISIT:  Chief Complaint   Patient presents with     Discharge Summary       HISTORY COURSE:  Sonu is a 89-year-old gentleman who has a history of chronic MAC infection and bronchiectasis who did present to the hospital with worsening shortness of breath.  Prior to that he was seen by his primary doctor had been on Augmentin and later azithromycin.  Recent productive cough and intermittent hemoptysis.  No lower extremity edema.  Also there is been a history of some recent weight loss.  His laboratory studies did show sodium of 147 potassium 4.5 BUN 32 creatinine 1.04 lactic acid 1.3 procalcitonin 0.08 white cells 9.8 hemoglobin 13.6 platelets 256.  The CTA of the chest did show an abundance of mucus debris of the left mainstem bronchus and bronchi to left lower lobes.  No airspace disease involving portions of both upper lobes which was superimposed upon the baseline inflammatory nodules present bilaterally.  No pulmonary emboli.  His assessment and plan did include ceftriaxone and azithromycin scheduled nebs supplemental oxygen infectious disease consult.  Regarding his acute hypoxic respiratory failure he did receive supplemental oxygen as needed.  He also does have severe mitral valve prolapse and regurgitation.  He also has a history of cognitive impairment.  He now is on the transitional care unit and has had a couple of therapy sessions and now will be discharging to a another transitional care unit.  He did finish his azithromycin 250 mg daily on July 7.  He still on Omnicef until July 12 300 mg twice daily.  He has been normotensive and afebrile and also on room air.  No respiratory issues including shortness of breath with exertion.  He did have a chest x-ray on July 7 which did show chronic  interstitial infiltrate along with COPD but no tuberculosis or CHF.    Review of Systems  Currently no reports of fever chills fatigue cough or cold or new other flulike symptoms.  Denies any myalgias arthralgias nausea vomiting diarrhea headache stiff neck or swollen glands.  There were no vitals filed for this visit.  Blood pressure 109/67 pulse 64 respiration 16 temperature 96.1 saturation room air 96% his weight 109 pounds  Physical Exam   Constitutional: No distress.   Thin build   HENT:   Head: Normocephalic.   Eyes: Pupils are equal, round, and reactive to light.   Neck: Neck supple. No thyromegaly present.   Cardiovascular: Normal rate, regular rhythm and normal heart sounds.   Mitral valve prolapse.   Pulmonary/Chest: Breath sounds normal.   Recent diagnosis with pneumonia.   Abdominal: Bowel sounds are normal. There is no tenderness. There is no guarding.   Musculoskeletal:   Generalized weakness.  Denies pain.  Shoulder impingement.   Lymphadenopathy:     He has no cervical adenopathy.   Neurological: He is alert.   Skin: Skin is warm and dry. No rash noted.   Psychiatric: His behavior is normal.     Lab Results   Component Value Date    WBC 6.2 07/09/2019    HGB 11.6 (L) 07/04/2019    HCT 37.2 (L) 07/04/2019    MCV 93 07/04/2019     07/04/2019     Results for orders placed or performed during the hospital encounter of 07/03/19   Basic Metabolic Panel   Result Value Ref Range    Sodium 143 136 - 145 mmol/L    Potassium 4.0 3.5 - 5.0 mmol/L    Chloride 108 (H) 98 - 107 mmol/L    CO2 30 22 - 31 mmol/L    Anion Gap, Calculation 5 5 - 18 mmol/L    Glucose 85 70 - 125 mg/dL    Calcium 8.6 8.5 - 10.5 mg/dL    BUN 24 8 - 28 mg/dL    Creatinine 0.72 0.70 - 1.30 mg/dL    GFR MDRD Af Amer >60 >60 mL/min/1.73m2    GFR MDRD Non Af Amer >60 >60 mL/min/1.73m2       Lab Results   Component Value Date    ALT 19 03/13/2018    AST 34 03/13/2018    ALKPHOS 73 03/13/2018    BILITOT 0.5 03/13/2018     Lab Results    Component Value Date    ALBUMIN 3.1 (L) 03/13/2018     Lab Results   Component Value Date    COLORU Yellow 07/09/2019    CLARITYU Clear 07/09/2019    GLUCOSEU Negative 07/09/2019    BILIRUBINUR Negative 07/09/2019    KETONESU Negative 07/09/2019    SPECGRAV 1.018 07/09/2019    HGBUA Negative 07/09/2019    PHUR 6.5 07/09/2019    PROTEINUA Negative 07/09/2019    UROBILINOGEN <2.0 E.U./dL 07/09/2019    NITRITE Negative 07/09/2019    LEUKOCYTESUR Negative 07/09/2019         MEDICATION LIST:  Current Outpatient Medications   Medication Sig     aspirin 81 mg chewable tablet Chew 81 mg daily.     carvedilol (COREG) 6.25 MG tablet Take 1 tablet (6.25 mg total) by mouth 2 (two) times a day with meals.     cefdinir (OMNICEF) 300 MG capsule Take 1 capsule (300 mg total) by mouth 2 times a day at 6:00 am and 4:00 pm for 6 days.     cyanocobalamin (VITAMIN B-12) 500 MCG tablet Take 500 mcg by mouth daily.     lisinopril (PRINIVIL,ZESTRIL) 2.5 MG tablet Take 1 tablet (2.5 mg total) by mouth daily.     multivitamin with minerals (THERA-M) 9 mg iron-400 mcg Tab tablet Take 1 tablet by mouth daily.     oxybutynin (DITROPAN) 5 MG tablet Take 5 mg by mouth daily as needed.       DISCHARGE DIAGNOSIS:    ICD-10-CM    1. Essential hypertension I10    2. Pneumonia of both upper lobes due to infectious organism (H) J18.1    3. Aortic stenosis, mild I35.0    4. MVP (mitral valve prolapse) I34.1    5. General weakness R53.1        MEDICAL EQUIPMENT NEEDS:  None    DISCHARGE PLAN/FACE TO FACE:  I certify that services are/were furnished while this patient was under the care of a physician and that a physician or an allowed non-physician practitioner (NPP), had a face-to-face encounter that meets the physician face-to-face encounter requirements. The encounter was in whole, or in part, related to the primary reason for home health. The patient is confined to his/her home and needs intermittent skilled nursing, physical therapy,  speech-language pathology, or the continued need for occupational therapy. A plan of care has been established by a physician and is periodically reviewed by a physician.  Date of Face-to-Face Encounter: July 10, 2019    I certify that, based on my findings, the following services are medically necessary home health services: He will be discharging to [facility] with current medications with an anticipated discharge date of July 10, 2019 he also receive physical and occupational therapy along with speech therapy.    My clinical findings support the need for the above skilled services because: (Please write a brief narrative summary that describes what the RN, PT, SLP, or other services will be doing in the home. A list of diagnoses in this section does not meet the CMS requirements.)  Secondary to his chronic medical conditions including his recent community-acquired pneumonia diagnosis along with his antibiotics close assessment safety transfers and speech therapy.    This patient is homebound because: (Please write a brief narrative summary describing the functional limitations as to why this patient is homebound and specifically what makes this patient homebound.)  Secondary to multiple chronic medical conditions including his recent CAP along with safety transfers self-care deficits assistance with ADLs and speech therapy.    The patient is, or has been, under my care and I have initiated the establishment of the plan of care. This patient will be followed by a physician who will periodically review the plan of care.    Schedule follow up visit with primary care provider within 7 days to reestablish care.  He will follow-up with the primary care team at the new transitional care unit to go over his medications as well as any other concerns or problems.    Discharge coordination care greater than 30 minutes.  Electronically signed by: Michael Duane Johnson, CNP

## 2021-05-30 NOTE — PROGRESS NOTES
[facility]      Facility:    [facility] [[id number]]  Code Status: DNR      Chief Complaint/Reason for Visit:  Chief Complaint   Patient presents with     H & P       HPI:   Sonu is a 89 y.o. male who was hospitalized most recently for pneumonia.  He does have a chronic MAC infection/bronchiectasis.  He does also have severe mitral valve prolapse with regurgitation.  He also has hypertension.  While he was in the hospital he had delirium.  His slums score was 19.  He was most recently transferred from [facility] care [facility].    Upon current review of systems he still has significant urinary frequency but the frequency is gone from every hour down to every 2-3 hours with the extended release oxybutynin.  He is not having fevers or chills.  He does not have sore throat or nasal congestion.  He does not have cough or shortness of breath currently compared to what it was before but he does have some chronic cough related to the bronchiectasis.  He does not have chest pain or palpitations of the heart.  He does not have abdominal pain or nausea or diarrhea.    Past Medical History:  Past Medical History:   Diagnosis Date     Arrhythmia     PAC's     Hypertension      Valvular disease     MVP with mod-severe MR           Surgical History:  Past Surgical History:   Procedure Laterality Date     APPENDECTOMY Right      INCISIONAL HERNIA REPAIR Left 05/23/2008    Surgeon Dr. Brandon at [facility].       Family History:     Family history is not pertinent to chief complaint or presenting problems.    Social History:    Social History     Socioeconomic History     Marital status:      Spouse name: Not on file     Number of children: Not on file     Years of education: Not on file     Highest education level: Not on file   Occupational History     Not on file   Social Needs     Financial resource strain: Not on file     Food insecurity:     Worry: Not on file      Inability: Not on file     Transportation needs:     Medical: Not on file     Non-medical: Not on file   Tobacco Use     Smoking status: Former Smoker     Types: Pipe     Smokeless tobacco: Never Used   Substance and Sexual Activity     Alcohol use: Not on file     Drug use: No     Sexual activity: Not on file   Lifestyle     Physical activity:     Days per week: Not on file     Minutes per session: Not on file     Stress: Not on file   Relationships     Social connections:     Talks on phone: Not on file     Gets together: Not on file     Attends Spiritism service: Not on file     Active member of club or organization: Not on file     Attends meetings of clubs or organizations: Not on file     Relationship status: Not on file     Intimate partner violence:     Fear of current or ex partner: Not on file     Emotionally abused: Not on file     Physically abused: Not on file     Forced sexual activity: Not on file   Other Topics Concern     Not on file   Social History Narrative     Not on file          Review of Systems   All other systems reviewed and are negative.      Vitals:    07/16/19 1416   BP: 119/63   Pulse: 76   Temp: 97.2  F (36.2  C)   SpO2: 94%       Physical Exam   Constitutional: No distress.   HENT:   Mouth/Throat: Oropharynx is clear and moist.   Eyes: Right eye exhibits no discharge. Left eye exhibits no discharge.   Neck: No JVD present. No thyromegaly present.   Cardiovascular: Normal heart sounds.   Pulmonary/Chest: Breath sounds normal. No respiratory distress.   Abdominal: Soft. Bowel sounds are normal. He exhibits no distension. There is no tenderness.   Musculoskeletal: He exhibits no edema or tenderness.   Lymphadenopathy:     He has no cervical adenopathy.   Neurological: He is alert.   Skin: Skin is warm and dry.   Psychiatric: He has a normal mood and affect.   Nursing note and vitals reviewed.      Medication List:  Current Outpatient Medications   Medication Sig     aspirin 81 mg  chewable tablet Chew 81 mg daily.     carvedilol (COREG) 6.25 MG tablet Take 1 tablet (6.25 mg total) by mouth 2 (two) times a day with meals.     cyanocobalamin (VITAMIN B-12) 500 MCG tablet Take 500 mcg by mouth daily.     lisinopril (PRINIVIL,ZESTRIL) 2.5 MG tablet Take 1 tablet (2.5 mg total) by mouth daily.     multivitamin with minerals (THERA-M) 9 mg iron-400 mcg Tab tablet Take 1 tablet by mouth daily.     oxybutynin (DITROPAN XL) 10 MG ER tablet Take 10 mg by mouth daily.       Labs:  No new laboratory testing.    Assessment:    ICD-10-CM    1. Pneumonia of both upper lobes due to infectious organism (H) J18.1    2. Bronchiectasis with acute exacerbation (H) J47.1    3. Overactive bladder N32.81    4. MVP (mitral valve prolapse) I34.1        Plan:  Continue with evaluation and treatment by physical and occupational therapies regarding strength, gait, and independence of activities of daily living.  Continue to monitor medical conditions.      Electronically signed by: Henrry Vargas MD

## 2021-05-30 NOTE — TELEPHONE ENCOUNTER
[facility] Nurse Triage Telephone Note      Provider: BRENDAN Feng  Facility: [facility]    Facility Type: TCU    Caller: Losi  Call Back Number:  [phone]    Allergies: Hydrocodone-acetaminophen and Tree and shrub pollen    Reason for call: Increase urinary incontinence today, having to urinate every 1 hr and not making it to the bathroom. Afebrile, no pain or burning on urination. afebrile    Verbal Order/Direction given by Provider: increase oxybutynin to 5mg two times a day, ok to give the extra dose now    Provider giving order: BRENDAN Feng    Verbal order given to: Greg Fernandez RN

## 2021-05-30 NOTE — PROGRESS NOTES
[facility]    DATE: 2019    NAME:  Sonu Solomon             :  1927  MRN: [id number]  CODE STATUS:  DNR/DNI    VISIT TYPE: Problem Visit (pain)     FACILITY:  [facility] [[id number]]       CHIEF COMPLAIN/REASON FOR VISIT:    Chief Complaint   Patient presents with     Problem Visit     pain               HISTORY OF PRESENT ILLNESS: Sonu Solomon is a 89 y.o. male who was admitted to [facility] 7/3- for worsening shortness of breath. CT showed debris in left mainstem bronchus, bronchi to left lower lobe, new airspace disease in both upper lobes. He has history of chronic DAVID infection/nodular bronchiectasis. He was treated for pneumonia and exacerbation. He was weaned from oxygen by discharge and was treated with azithromycin and omnicef. He had some delirium during stay and it was suspected he had underlying dementia. His slums score in hospital was 19. He had preivously been struggling at home to remember to eat and had lost a significant amoutn of weight. He was driving but would frequently get lost. He was discharged to [facility]. [facility] He has PMH of HTN, chronic DAVID infection, severe mitral valve prolapse, regurgitation, hyponatremia, aortic stenosis. Prior to this he lived at home and was still driving.     Today Mr. Solomon is seen for concerns of left chest, shoulder, arm pain. He mentioned this to nursing this morning and did not have anything ordered so nursing gave standing house tylenol order. Nursing reports he is complaining a lot less about his urinary problems and not bringing it up to staff any more. They think his symptoms must be better and believe he only got up 1-2 times last night. On exam he is seen in his recliner in room. He says he did get up a couple times last night to urinate and has been keeping track. He shows provider the log he is keeping of how often he  urinates and it was less last night and only 2 times instead of the usual 3-5. He again says he took something at home but can't remember the name. Discussed this is improving and will continue current medication. He says he had some left sided chest pain last evening after therapy and it was still there this morning so he asked for something for it. He says it is gone now and the tylenol was effective. He says it hurt when he took a deep breath or when he pushed on the area. He denies shortness of breath, cough, pain anywhere else. He did have some pain in his left shoulder as well. He slept well and no problems with his bowels. He goes about twice per day. He says he toured the apartments yesterday and is not happy about moving but says his daughters are telling him he needs to. He thinks he will be able to continue driving and wants to know if he can park his car in the garage. He wants to be able to go out and about when he wants. Discussed safety concerns, memory issues, cognitive issues and do not recommend driving. He says he will take the driving test and if he passes he plans to continue to drive. He thinks he had no problems and is safe driving. He has a care conference today.     REVIEW OF SYSTEMS:  PROBLEMS AND REVIEW OF SYSTEMS:   Today on ROS:   Currently, no fever, chills, or rigors. Decreased vision, hearing intact. Denies any chest pain, headaches, palpitations, lightheadedness, dizziness, shortness of breath. Appetite is good. Denies any GERD symptoms. Denies any difficulty with swallowing, nausea, or vomiting.  Denies any abdominal pain, diarrhea or constipation. No insomnia. No active bleeding. No rash. Positive for forgetfulness, urinary frequency, nocturia, urgency, incontinence-improving, left sided chest wall pain, shoulder pain now resolved      Allergies   Allergen Reactions     Hydrocodone-Acetaminophen Nausea Only     Tree And Shrub Pollen Other (See Comments)     Sneezing from evergreen  tree pollen in winter.     Current Outpatient Medications   Medication Sig     acetaminophen (TYLENOL) 500 MG tablet Take 1,000 mg by mouth 3 (three) times a day as needed for pain.     aspirin 81 mg chewable tablet Chew 81 mg daily.     carvedilol (COREG) 6.25 MG tablet Take 1 tablet (6.25 mg total) by mouth 2 (two) times a day with meals.     cyanocobalamin (VITAMIN B-12) 500 MCG tablet Take 500 mcg by mouth daily.     lisinopril (PRINIVIL,ZESTRIL) 2.5 MG tablet Take 1 tablet (2.5 mg total) by mouth daily.     multivitamin with minerals (THERA-M) 9 mg iron-400 mcg Tab tablet Take 1 tablet by mouth daily.     oxybutynin (DITROPAN XL) 10 MG ER tablet Take 10 mg by mouth daily.     Past Medical History:    Past Medical History:   Diagnosis Date     Arrhythmia     PAC's     Hypertension      Valvular disease     MVP with mod-severe MR           PHYSICAL EXAMINATION  Vitals:    07/18/19 0700   BP: 105/76   Pulse: 76   Resp: 18   Temp: 98  F (36.7  C)   SpO2: 93%       Today on physical exam:     GENERAL: Awake, Alert, oriented x3, not in any form of acute distress, answers questions appropriately, follows simple commands, conversant, small frame  HEENT: Head is normocephalic with normal hair distribution. No evidence of trauma. Ears: No acute purulent discharge. Eyes: Conjunctivae pink with no scleral jaundice. Nose: Normal mucosa and septum. NECK: Supple with no cervical or supraclavicular lymphadenopathy. Trachea is midline.   CHEST: No tenderness or deformity, no crepitus  LUNG: dim to auscultation with good chest expansion. There are no crackles or wheezes, normal AP diameter.  BACK: No kyphosis of the thoracic spine. Symmetric, no curvature, ROM normal, no CVA tenderness, no spinal tenderness   CVS: There is good S1  S2, regular rhythm, murmur present,  2+ pulses symmetric in all extremities.  ABDOMEN: concave and soft, nontender to palpation, non distended, no masses, no organomegaly, good bowel sounds, no  rebound or guarding, no peritoneal signs.   EXTREMITIES: No pedal edema, Atraumatic. Full range of motion on both upper and lower extremities, there is no tenderness to palpation, no cyanosis or clubbing, no calf tenderness.  Pulses equal in all extremities, normal cap refill, no joint swelling.  SKIN: Warm and dry, no erythema noted.  Skin color, texture, no rashes or lesions.  NEUROLOGICAL: The patient is oriented to person, place and time, forgetful at times.           LABS:   No results found for this or any previous visit (from the past 168 hour(s)).  Results for orders placed or performed during the hospital encounter of 07/03/19   Basic Metabolic Panel   Result Value Ref Range    Sodium 143 136 - 145 mmol/L    Potassium 4.0 3.5 - 5.0 mmol/L    Chloride 108 (H) 98 - 107 mmol/L    CO2 30 22 - 31 mmol/L    Anion Gap, Calculation 5 5 - 18 mmol/L    Glucose 85 70 - 125 mg/dL    Calcium 8.6 8.5 - 10.5 mg/dL    BUN 24 8 - 28 mg/dL    Creatinine 0.72 0.70 - 1.30 mg/dL    GFR MDRD Af Amer >60 >60 mL/min/1.73m2    GFR MDRD Non Af Amer >60 >60 mL/min/1.73m2         Lab Results   Component Value Date    WBC 6.2 07/09/2019    HGB 11.6 (L) 07/04/2019    HCT 37.2 (L) 07/04/2019    MCV 93 07/04/2019     07/04/2019       Lab Results   Component Value Date    [id number] 546 02/15/2019     No results found for: HGBA1C  Lab Results   Component Value Date    INR 1.05 03/13/2018     No results found for: [id number]  Lab Results   Component Value Date    TSH 2.04 05/28/2015           ASSESSMENT/PLAN:    1. Community acquired pneumonia, Acute on chronic bronchiectasis, chronic DAVID infection: completed cefdinir 7/12. Cough resolved. No shortness of breath. No hypoxia, fevers, chills. Much improved. F/u with ID prn only. No concerns today.   2. HTN: SBP 100s. On coreg, lisinopril. hold parameters. No orthostasis in therapy thus far. Has been well controlled.   3. Aortic stenosis, severe mitral valve prolapse, regurgitation:  On aspirin, coreg, lisinopril. Cardiac appt postponed.  F/u after discharge.   4. Overactive bladder: Reports urinary urgency, frequency, nocturia, incontinence at times. Long term problem, very forgetful and does not recall this is not new for him. Previously increased oxybutynin and changed to long acting, only went twice during night. Much improved.   5. Vitamin b 12 deficiency: On vitamin b12. Last b12 level on chart from February 545. Check level on 7/19.  6. Anorexia: recent weight loss, forgetting to eat. Down to 102lbs, 108-105lbs . Will monitor 3 times weekly.   7. Left chest wall strain, musculoskeletal pain: Now resolved with tylenol. No limited rom of shoulder, dull, aching pain, reproducible with touch, painful with deep breathing. No shortness of breath or other symptoms. Ordered tylenol 1000 three times a day prn, ice q4h prn.     Bmp, hm1, vit b 12 on 7/19    Per therapy PT- SBA/S t/f's , 400 ft SBA no AD, EUBANKS 39/56 - OT - ADL - SBA, safety 15/20,  SLUMS 14/30 ST - 20/30 MOCA  appears inflated based upon observation of completing tasks. Green tag. LCD 7/22. Lives in [facility] - plans to move to [facility]. CHIDI camilo PT, OT, HHA. Moving to [facility]    Electronically signed by: Estephania Espinosa NP

## 2021-05-30 NOTE — PROGRESS NOTES
[facility]    DATE: 2019    NAME:  Sonu Solomon             :  1927  MRN: [id number]  CODE STATUS:  DNR/DNI    VISIT TYPE: Problem Visit (hospital f/u)     FACILITY:  [facility] [[id number]]       CHIEF COMPLAIN/REASON FOR VISIT:    Chief Complaint   Patient presents with     Problem Visit     hospital f/u               HISTORY OF PRESENT ILLNESS: Sonu Solomon is a 89 y.o. male who was admitted to [facility] 7/3- for worsening shortness of breath. CT showed debris in left mainstem bronchus, bronchi to left lower lobe, new airspace disease in both upper lobes. He has history of chronic DAVID infection/nodular bronchiectasis. He was treated for pneumonia and exacerbation. He was weaned from oxygen by discharge and was treated with azithromycin and omnicef. He had some delirium during stay and it was suspected he had underlying dementia. His slums score in hospital was 19. He had preivously been struggling at home to remember to eat and had lost a significant amoutn of weight. He was driving but would frequently get lost. [facility] He has PMH of HTN, chronic DAVID infection, severe mitral valve prolapse, regurgitation, hyponatremia, aortic stenosis. Prior to this he lived at home and was still driving.     Today Mr. Solomon is seen in his room with daughter present. She is an LPN and her sister is an RN. Sonu says his main concern is his urinary frequency at night. He used to get up about 1-2 times per night at home to go to the bathroom but now he is getting up 3-4 times per night. He had been taking medicine at home for this. His daughter reports and shows provider box from home that was a natural supplement for prostate health. He says he took this every day and it helped at first but then it didn't. His daughter reports he had seen a urologist a few years ago and  had an  prescription for oxybutynin at home. This change started about Monday and at the other TCU they did check a UA which was negative for infection and his labs looked good. They even did a bladder scan and said he was not retaining anything. The daughter is wondering if he should be on flomax or if the oxybutynin should be scheduled. Right now it is ordered as needed and she was told by the nurses at [facility] that there could be issues with his blood pressure giving it at night. She had not heard this before. She also noticed around the time he was complaining of the urinary symptoms he started having more balance issues. So she was very surprised that he did not have a UTI. She said even the therapists at [facility] commented on how different he was. Other than this he is sleeping well and has no pain. He denies shortness of breath but does cough sometimes. He is coughing into a cup on occasion some clear phlegm. His bowels are moving fine and appetite is great. He has no stomach upset or nausea. He had recently lost weight at home due to forgetting to eat. His weight at home was around 102lbs but they think he has gained now. They are hoping to monitor this closely while he is here. He is not having any dizziness and today in therapy they even checked his orthostatic vitals and they were good. His systolic blood pressure remained 100-110s even with standing. He does have a runny nose and has allergy symptoms. His daughter says she had him on zyrtec at home but wants to wait until his pneumonia is clear before this is restarted. He is not having any chest pain, pressure, palpitations. He has no swelling in his legs. He wears glasses but no hearing aids. He has had issues with his memory for the past year and half, but especially last few months. He is receptive to doing speech therapy or cognitive therapy. He did not use a cane or walker at home before and was still driving. His daughter is concerned  that he should not be living alone anymore. He says he knew this day was coming and had sold his house 4 years ago and downsized to a townhouse. His daughter reports he was preparing all of them for this some day and each child has their own role in his care. Her sister Samantha is his POA and is taking care of his finances and they will work on selling his home. She is also POA for healthcare as she is a nurse. They have him on a waiting list for [facility] because he attends [facility] and they are connected to them. They think he will probably know some people there. His daughter is asking whether he should attend a cardiology appointment next week that is just routine checking on his mitral valve or if this could be postponed until later. He also has an infectious disease appointment they are going to cancel because they were told he only needed to follow up if having more problems. She is wondering provider's opinion on these. Discussed since they are routing and not having current issues can wait until after TCU stay. When therapy evaluated today they gave ok for no assistive device, just said would work on balance and strengthening. She is wondering how long he will be here. His daughter states he is very intelligent  who worked for [employer] and has many patents and contracts. He even worked with [employer].     REVIEW OF SYSTEMS:  PROBLEMS AND REVIEW OF SYSTEMS:   Today on ROS:   Currently, no fever, chills, or rigors. Decreased vision, hearing intact. Denies any chest pain, headaches, palpitations, lightheadedness, dizziness, shortness of breath. Appetite is good. Denies any GERD symptoms. Denies any difficulty with swallowing, nausea, or vomiting.  Denies any abdominal pain, diarrhea or constipation. No insomnia. No active bleeding. No rash. Positive for forgetfulness, weight loss, urinary frequency, nocturia, urgency, incontinence, runny nose, congested cough at times      Allergies    Allergen Reactions     Hydrocodone-Acetaminophen Nausea Only     Tree And Shrub Pollen Other (See Comments)     Sneezing from evergreen tree pollen in winter.     Current Outpatient Medications   Medication Sig     aspirin 81 mg chewable tablet Chew 81 mg daily.     carvedilol (COREG) 6.25 MG tablet Take 1 tablet (6.25 mg total) by mouth 2 (two) times a day with meals.     cefdinir (OMNICEF) 300 MG capsule Take 1 capsule (300 mg total) by mouth 2 times a day at 6:00 am and 4:00 pm for 6 days.     cyanocobalamin (VITAMIN B-12) 500 MCG tablet Take 500 mcg by mouth daily.     lisinopril (PRINIVIL,ZESTRIL) 2.5 MG tablet Take 1 tablet (2.5 mg total) by mouth daily.     multivitamin with minerals (THERA-M) 9 mg iron-400 mcg Tab tablet Take 1 tablet by mouth daily.     oxybutynin (DITROPAN) 5 MG tablet Take 5 mg by mouth at bedtime.            Past Medical History:    Past Medical History:   Diagnosis Date     Arrhythmia     PAC's     Hypertension      Valvular disease     MVP with mod-severe MR           PHYSICAL EXAMINATION  Vitals:    07/11/19 0700   BP: 130/79   Pulse: 80   Resp: 18   Temp: 97.2  F (36.2  C)   SpO2: 90%       Today on physical exam:     GENERAL: Awake, Alert, oriented x3, not in any form of acute distress, answers questions appropriately, follows simple commands, conversant, small frame  HEENT: Head is normocephalic with normal hair distribution. No evidence of trauma. Ears: No acute purulent discharge. Eyes: Conjunctivae pink with no scleral jaundice. Nose: Normal mucosa and septum. NECK: Supple with no cervical or supraclavicular lymphadenopathy. Trachea is midline. Clear rhinorrhea  CHEST: No tenderness or deformity, no crepitus  LUNG: dim to auscultation with good chest expansion. There are no crackles or wheezes, normal AP diameter.  BACK: No kyphosis of the thoracic spine. Symmetric, no curvature, ROM normal, no CVA tenderness, no spinal tenderness   CVS: There is good S1  S2, regular rhythm,  murmur present,  2+ pulses symmetric in all extremities.  ABDOMEN: concave and soft, nontender to palpation, non distended, no masses, no organomegaly, good bowel sounds, no rebound or guarding, no peritoneal signs.   EXTREMITIES: No pedal edema, Atraumatic. Full range of motion on both upper and lower extremities, there is no tenderness to palpation, no cyanosis or clubbing, no calf tenderness.  Pulses equal in all extremities, normal cap refill, no joint swelling.  SKIN: Warm and dry, no erythema noted.  Skin color, texture, no rashes or lesions.  NEUROLOGICAL: The patient is oriented to person, place and time, forgetful at times.           LABS:   Recent Results (from the past 168 hour(s))   Culture/Stain, Mycobacterium, Respiratory   Result Value Ref Range    AFB Stain No acid fast bacilli seen    ECG 12 lead MUSE   Result Value Ref Range    SYSTOLIC BLOOD PRESSURE  mmHg    DIASTOLIC BLOOD PRESSURE  mmHg    VENTRICULAR RATE 69 BPM    ATRIAL RATE 69 BPM    P-R INTERVAL 158 ms    QRS DURATION 98 ms    Q-T INTERVAL 440 ms    QTC CALCULATION (BEZET) 471 ms    P Axis 79 degrees    R AXIS 86 degrees    T AXIS 45 degrees    MUSE DIAGNOSIS       ** Poor data quality, interpretation may be adversely affected  Sinus rhythm with marked sinus arrhythmia  Minimal criteria for Nonspecific ST and T wave abnormality  Abnormal ECG  When compared with ECG of 03-JUL-2019 17:15,  Poor data quality in current ECG precludes serial comparison    Confirmed by JUANCARLOS LOZADA MD LOC:[facility] ([id number]) on 7/5/2019 2:29:33 PM     Urinalysis   Result Value Ref Range    Color, UA Yellow Colorless, Yellow, Straw, Light Yellow    Clarity, UA Clear Clear    Glucose, UA Negative Negative    Bilirubin, UA Negative Negative    Ketones, UA Negative Negative    Specific Gravity, UA 1.018 1.001 - 1.030    Blood, UA Negative Negative    pH, UA 6.5 4.5 - 8.0    Protein, UA Negative Negative mg/dL    Urobilinogen, UA <2.0 E.U./dL <2.0 E.U./dL, 2.0 E.U./dL     Nitrite, UA Negative Negative    Leukocytes, UA Negative Negative   Culture, Urine   Result Value Ref Range    Culture No Growth    White Blood Count (WBC)   Result Value Ref Range    WBC 6.2 4.0 - 11.0 thou/uL     Results for orders placed or performed during the hospital encounter of 07/03/19   Basic Metabolic Panel   Result Value Ref Range    Sodium 143 136 - 145 mmol/L    Potassium 4.0 3.5 - 5.0 mmol/L    Chloride 108 (H) 98 - 107 mmol/L    CO2 30 22 - 31 mmol/L    Anion Gap, Calculation 5 5 - 18 mmol/L    Glucose 85 70 - 125 mg/dL    Calcium 8.6 8.5 - 10.5 mg/dL    BUN 24 8 - 28 mg/dL    Creatinine 0.72 0.70 - 1.30 mg/dL    GFR MDRD Af Amer >60 >60 mL/min/1.73m2    GFR MDRD Non Af Amer >60 >60 mL/min/1.73m2         Lab Results   Component Value Date    WBC 6.2 07/09/2019    HGB 11.6 (L) 07/04/2019    HCT 37.2 (L) 07/04/2019    MCV 93 07/04/2019     07/04/2019       Lab Results   Component Value Date    [id number] 546 02/15/2019     No results found for: HGBA1C  Lab Results   Component Value Date    INR 1.05 03/13/2018     No results found for: [id number]  Lab Results   Component Value Date    TSH 2.04 05/28/2015           ASSESSMENT/PLAN:    1. Community acquired pneumonia, Acute on chronic bronchiectasis, chronic DAVID infection: On cefdinir until 7/12. Congested cough at times-clear phlegm only. No shortness of breath. No hypoxia, fevers, chills. Much improved. F/u 7/17 with ID - daughter cancelling as doing well.   2. HTN: SBP 130s. On coreg, lisinopril. Ordered hold parameters. No orthostasis in therapy today.   3. Aortic stenosis, severe mitral valve prolapse, regurgitation: On aspirin, coreg, lisinopril. F/u scheduled for 7/16 will postpone until after discharge.   4. Overactive bladder: Reports urinary urgency, frequency, nocturia, incontinence at times. Previously took oxybutynin per urology. Now ordered prn. Will change to at bedtime and monitor symptoms. Urine culture no growth on 7/9.  Discussed flomax and since not retaining and not having symptoms relating to hesitancy, difficulty starting, not complete emptying feel more bladder wall irritation and OAB instead of just BPH symptoms.   5. Vitamin b 12 deficiency: On vitamin b12. Last b12 level on chart from February 545. Will consider rechecking with next lab draw.   6. Anorexia: recent weight loss, forgetting to eat. Down to 102lbs, no weight yet this admission. Will monitor 3 times weekly.     Labs recently done 7/4 and 7/9 stable    Therapy eval today    Electronically signed by: Estephania Espinosa NP    Total floor/unit time spent 35 with 25 time spent on counseling and coordination of care. Counseling was done regarding hospital course, previous tcu course, urinary symptoms, causes, treatment options. Counseling regarding weight monitoring, lab monitoring, specialty care follow up, potential discharge plan and options for increased level of care. Coordinated care with nursing for clarification of admission orders, management of overactive bladder, urinary incontinence, lab monitoring, skilled therapy needs.

## 2021-05-31 VITALS — HEIGHT: 66 IN | WEIGHT: 113 LBS | BODY MASS INDEX: 18.16 KG/M2

## 2021-05-31 VITALS — HEIGHT: 66 IN | WEIGHT: 110 LBS | BODY MASS INDEX: 17.68 KG/M2

## 2021-05-31 VITALS — BODY MASS INDEX: 18.4 KG/M2 | WEIGHT: 114 LBS

## 2021-05-31 VITALS — WEIGHT: 113.3 LBS | BODY MASS INDEX: 18.29 KG/M2

## 2021-05-31 VITALS — WEIGHT: 115 LBS | BODY MASS INDEX: 18.56 KG/M2

## 2021-05-31 NOTE — TELEPHONE ENCOUNTER
[facility] Nurse Triage Telephone Note      Provider: BRENDAN Feng  Facility: [facility]    Facility Type: [facility]    Caller: Hortensia  Call Back Number:  [phone]    Allergies: Hydrocodone-acetaminophen and Tree and shrub pollen    Reason for call: Pt was working with an outside therapy company today and the pt became dizzy and his bp while standing was 80/58 and sitting was 91/66 p 94.   The family has noted that the pt was a little confused today.   On lisnopril 2.5mg daily      Verbal Order/Direction given by Provider: hold lisinopril x1week then do a BP and P and update. UA/UC     Provider giving order: BRENDAN Feng    Verbal order given to: Hortensia Fernandez RN

## 2021-05-31 NOTE — PROGRESS NOTES
[facility]    DATE: 2019    NAME:  Sonu Solomon             :  1927  MRN: [id number]  CODE STATUS:  DNR/DNI    VISIT TYPE: H & P     FACILITY:  [facility] [[id number]]       CHIEF COMPLAIN/REASON FOR VISIT:    Chief Complaint   Patient presents with     H & P               HISTORY OF PRESENT ILLNESS: Sonu Solomon is a 89 y.o. male who was admitted to [facility], then went to TCU. He just recently moved into [facility] and will be followed by in house provider service. He has PMH of HTN, chronic DAVID infection, severe mitral valve prolapse, regurgitation, hyponatremia, aortic stenosis.     Today Mr. Solomon is seen in his apartment today with two daughters present Roxy and Catia. He has 6 children total, two boys and 4 girls. He says he is doing well and the pain in his chest he had in tcu is now gone. He says he has some shoulder pain at times but not bad recently. He is not having any shortness of breath. He gets lightheaded when he gets up really quick. It passes quickly then. He denies any fevers, chills, nausea, vomiting, or indigestion recently. He did sleep well last night and has been falling asleep quickly. He uses the urinal at night and says he got up twice to go to the bathroom. Aparna Nayak says her sister has been staying with him the last couple nights in his apartment and he has only been getting up once. They feel his nighttime urinating issue is much improved. He is not so sure and at first is a little argumentative about this saying the previous med he was on was helping him more but he does eventually calm down when discussing what was observed during tcu and from his daughter. He is agreeable to continuing his meds as is. He says he has a slight cough at times and some nasal congestion and allergies. He doesn't want any allergy meds but says his daughter brought him some nasal spray and he uses this intermittently.  The daughters found it and it is saline nasal spray. They said at one time he was using flonase but now just the nasal spray. He says he has been attending [employer] and some activities. He has made friends and attends meals in the dining rawls for lunch and dinner. He has a good appetite. He denies any swelling in his legs. He says he has some help in his apartment. His daughter says his family is setting up meds, doing laundry, and cleaning apartment. He has a shower twice a week with home health aid and once [facility] home health is finished they will have the aids here at [facility] do this. He is going to have PT for 6 weeks through [facility] and OT for 4 weeks. His daughter states he is due to see dentist in fall. He was following with urology once a year to check psa and follow up on the nocturia. He also follows with dermatology for history of basal cell carcinoma and mostly on his face. His daughters are wondering if he could stop seeing his urologist and just be managed here by primary. They are also wondering if he could stop seeing his dermatologist and have skin checks done by primary and if there is a spot that is concerning then could go to dermatology. He does still see his dentist but wondering if there is an in house dentist. His daughters are also wondering about the cardiologist for his mitral valve issues and since they are not pursuing any surgical intervention if he really needs to be monitored every year for this. Discussed and will forgo any further cardiology follow ups and just monitor symptoms at this time. They would like their dad to be educated on what to expect as this deteriorates. His daughters also wanted to discuss meds and whether it is necessary for him to still take aspirin and his multivitamin. After discussion it was decided to stop these. We discussed risks v benefits. He has a difficult time swallowing the vitamin anyways. His daughters are wondering if his weights and vitals can be  monitored weekly to keep a closer eye on his o2 sats and hopefully catch earlier when he is getting sick. His daughters are concerned about him catching illnesses too easily and wondering if there is a way for staff to try to help prevent this. They are wondering if staff can alert them when there is stuff going around and maybe encourage him to eat in room if there are a lot of residents ill. His daughters are also wondering if his anemia could be related to his MAC condition. They would like his hemoglobin rechecked to see how it is trending.     REVIEW OF SYSTEMS:  PROBLEMS AND REVIEW OF SYSTEMS:   Today on ROS:   Currently, no fever, chills, or rigors. Decreased vision, hearing intact. Denies any chest pain, headaches, palpitations, lightheadedness, dizziness, shortness of breath. Appetite is good. Denies any GERD symptoms. Denies any difficulty with swallowing, nausea, or vomiting.  Denies any abdominal pain, diarrhea or constipation. No insomnia. No active bleeding. No rash. Positive for forgetfulness, urinary frequency, nocturia, urgency, incontinence at times, nasal congestion    Allergies   Allergen Reactions     Hydrocodone-Acetaminophen Nausea Only     Tree And Shrub Pollen Other (See Comments)     Sneezing from evergreen tree pollen in winter.     Current Outpatient Medications   Medication Sig     sodium chloride (OCEAN) 0.65 % nasal spray Apply 1 spray into each nostril as needed for congestion.     acetaminophen (TYLENOL) 500 MG tablet Take 1,000 mg by mouth 3 (three) times a day as needed for pain.     carvedilol (COREG) 6.25 MG tablet Take 1 tablet (6.25 mg total) by mouth 2 (two) times a day with meals.     cyanocobalamin (VITAMIN B-12) 500 MCG tablet Take 500 mcg by mouth daily.     lisinopril (PRINIVIL,ZESTRIL) 2.5 MG tablet Take 1 tablet (2.5 mg total) by mouth daily.     oxybutynin (DITROPAN XL) 10 MG ER tablet Take 10 mg by mouth daily.     Past Medical History:    Past Medical History:    Diagnosis Date     Arrhythmia     PAC's     Hypertension      Valvular disease     MVP with mod-severe MR           PHYSICAL EXAMINATION  Vitals:    08/01/19 0700   BP: 110/72   Pulse: 60   Resp: 18   Temp: 98.3  F (36.8  C)   SpO2: 96%   Weight: 105 lb (47.6 kg)       Today on physical exam:     GENERAL: Awake, Alert, oriented x3, not in any form of acute distress, answers questions appropriately, follows simple commands, conversant, small frame  HEENT: Head is normocephalic with normal hair distribution. No evidence of trauma. Ears: No acute purulent discharge. Eyes: Conjunctivae pink with no scleral jaundice. Nose: Normal mucosa and septum. NECK: Supple with no cervical or supraclavicular lymphadenopathy. Trachea is midline. [place]  CHEST: No tenderness or deformity, no crepitus  LUNG: dim to auscultation with good chest expansion. There are no crackles or wheezes, normal AP diameter.  BACK: No kyphosis of the thoracic spine. Symmetric, no curvature, ROM normal, no CVA tenderness, no spinal tenderness   CVS: There is good S1  S2, regular rhythm, murmur present,  2+ pulses symmetric in all extremities.  ABDOMEN: concave and soft, nontender to palpation, non distended, no masses, no organomegaly, good bowel sounds, no rebound or guarding, no peritoneal signs.   EXTREMITIES: No pedal edema, Atraumatic. Full range of motion on both upper and lower extremities, there is no tenderness to palpation, no cyanosis or clubbing, no calf tenderness.  Pulses equal in all extremities, normal cap refill, no joint swelling.  SKIN: Warm and dry, no erythema noted.  Skin color, texture, no rashes or lesions.  NEUROLOGICAL: The patient is oriented to person, place and time, forgetful at times.           LABS:   No results found for this or any previous visit (from the past 168 hour(s)).  Results for orders placed or performed in visit on 07/19/19   Basic Metabolic Panel   Result Value Ref Range    Sodium 136 136 - 145 mmol/L     Potassium 4.2 3.5 - 5.0 mmol/L    Chloride 103 98 - 107 mmol/L    CO2 28 22 - 31 mmol/L    Anion Gap, Calculation 5 5 - 18 mmol/L    Glucose 75 70 - 125 mg/dL    Calcium 8.8 8.5 - 10.5 mg/dL    BUN 17 8 - 28 mg/dL    Creatinine 0.68 (L) 0.70 - 1.30 mg/dL    GFR MDRD Af Amer >60 >60 mL/min/1.73m2    GFR MDRD Non Af Amer >60 >60 mL/min/1.73m2         Lab Results   Component Value Date    WBC 8.2 07/19/2019    HGB 10.7 (L) 07/19/2019    HCT 33.9 (L) 07/19/2019    MCV 91 07/19/2019     07/19/2019       Lab Results   Component Value Date    TZNWBPXK15 657 07/19/2019     No results found for: HGBA1C  Lab Results   Component Value Date    INR 1.05 03/13/2018     No results found for: [id number]  Lab Results   Component Value Date    TSH 2.04 05/28/2015           ASSESSMENT/PLAN:    1. Chronic bronchiectasis, chronic DAVID: recent pneumonia. F/u with ID prn only if want to treat chronically with prophylactic antibiotics.  Lungs clear, o2 sats stable. Family requesting weekly vitals to monitor closely, avoiding public places, avoiding ill contacts. Hand  in apartment. Will discuss with staff if able to notify family if illnesses going around or to avoid dining rawls. No ID follow up at this time, given age need to weigh risk v benefit of long term high dose antibiotic treatment. Monitor for now.   2. HTN: SBP 110s. On coreg, lisinopril.   3. Aortic stenosis, severe mitral valve prolapse, regurgitation: On aspirin, coreg, lisinopril. No cardiology follow up at this time. Stable, progressing over time and was just monitoring. Non surgical candidate. Discussed risk v benefit aspirin therapy and decided to stop.   4. Overactive bladder: On oxybutynin and now stable. Only 1-2 episodes of nocturia per night. Family requested PSA will check with next hg.   5. Vitamin b 12 deficiency: On vitamin b12. Last b12 level on chart from February 545. Check level on 7/.  6. Anorexia: resolved now. [phone].    7. Anemia of chronic disease: Hg  11.6 on 7/4-10.7 on 7/19. Recheck hg in 4 weeks.   8. Allergic rhinitis: saline nasal spray prn.     Electronically signed by: Estephania Espinosa NP     Total floor/unit time spent 60 min with 40 min spent on counseling and coordination of care. Counseling regarding med changes, risk v benefit of aspirin therapy, multivitamin, oab treatment, lab monitoring, anemia. discussed services for AL, home care, specialty care follow ups. Coordinated care with nursing, family for management of specialty care follow ups including dermatology, urology, cardiology, dentist, eye doctor, med changes, services for independent living, allergic rhinitis management, lab monitoring.

## 2021-05-31 NOTE — PROGRESS NOTES
[facility]    DATE: 2019    NAME:  Sonu Solomon             :  1927  MRN: [id number]  CODE STATUS:  DNR/DNI    VISIT TYPE: Problem Visit (dizziness, hypotension)     FACILITY:  [facility] [[id number]]       CHIEF COMPLAIN/REASON FOR VISIT:    Chief Complaint   Patient presents with     Problem Visit     dizziness, hypotension               HISTORY OF PRESENT ILLNESS: Sonu Solomon is a 89 y.o. male who is a resident of [facility] and will be followed by in house provider service. He has PMH of HTN, chronic DAVID infection, severe mitral valve prolapse, regurgitation, hyponatremia, aortic stenosis.     Today Mr. Solomon is seen for concerns of dizziness and lightheadedness episode with therapy. His blood pressure was low 80/58 standing and sitting was 91/66. His pulse was in 90s. He did not pass out but therapy and family felt he was a little confused. UA was ordered and held lisinopril. He was encouraged to push fluids and recheck blood pressure one week. On exam today he is seen with daughter Aparna loera. He does not recall having an episode with therapy but does admit he feels dizzy at times. He says he is trying to drink fluids and his daughter showed a schedule they had written out for him to follow each day and it has listed on there when to fill up his water cup. She says he has been trying to follow this. She says he has always tended to try not to drink a lot of fluids so he didn't have to urinate as often. His blood pressure this morning is 90/61. We discussed he may still be dehydrated and blood pressure medicine has been held for two days. We will go ahead and discontinue the lisinopril altogether. Will still have nursing recheck blood pressure next week. If he remains low we discussed reducing his coreg dose. His weight was also noted to be down again today to 102lbs and he has been using the same scale. He was up to  110lbs before. He drinks one boost a day after breakfast and we discussed adding a second one after dinner. His daughter will add this to his routine schedule. He denies any feelings of passing out or dizziness today. He is not having any urinary trouble or shortness of breath. We discussed his UA was negative for UTI. If his blood pressure remains low next week we will also consider checking labs. No other concerns per him and daughter today.     REVIEW OF SYSTEMS:  PROBLEMS AND REVIEW OF SYSTEMS:   Today on ROS:   Currently, no fever, chills, or rigors. Decreased vision, hearing intact. Denies any chest pain, headaches, palpitations, lightheadedness, dizziness, shortness of breath. Appetite is good. Denies any GERD symptoms. Denies any difficulty with swallowing, nausea, or vomiting.  Denies any abdominal pain, diarrhea or constipation. No insomnia. No active bleeding. No rash. Positive for forgetfulness, urinary frequency, nocturia, urgency, incontinence at times, weight loss, hypotension    Allergies   Allergen Reactions     Hydrocodone-Acetaminophen Nausea Only     Tree And Shrub Pollen Other (See Comments)     Sneezing from evergreen tree pollen in winter.     Current Outpatient Medications   Medication Sig     acetaminophen (TYLENOL) 500 MG tablet Take 1,000 mg by mouth 3 (three) times a day as needed for pain.     carvedilol (COREG) 6.25 MG tablet Take 1 tablet (6.25 mg total) by mouth 2 (two) times a day with meals.     cyanocobalamin (VITAMIN B-12) 500 MCG tablet Take 500 mcg by mouth daily.     oxybutynin (DITROPAN XL) 10 MG ER tablet Take 10 mg by mouth daily.     sodium chloride (OCEAN) 0.65 % nasal spray Apply 1 spray into each nostril as needed for congestion.     Past Medical History:    Past Medical History:   Diagnosis Date     Arrhythmia     PAC's     Hypertension      Valvular disease     MVP with mod-severe MR           PHYSICAL EXAMINATION  Vitals:    08/14/19 0700   BP: 90/61   Pulse: 81    Resp: 18   Temp: 97.4  F (36.3  C)   SpO2: 94%   Weight: 102 lb (46.3 kg)       Today on physical exam:     GENERAL: Awake, Alert, oriented x3, not in any form of acute distress, answers questions appropriately, follows simple commands, conversant, small frame  HEENT: Head is normocephalic with normal hair distribution. No evidence of trauma. Ears: No acute purulent discharge. Eyes: Conjunctivae pink with no scleral jaundice. Nose: Normal mucosa and septum. NECK: Supple with no cervical or supraclavicular lymphadenopathy. Trachea is midline. [place]  CHEST: No tenderness or deformity, no crepitus  LUNG: dim to auscultation with good chest expansion. There are no crackles or wheezes, normal AP diameter.  BACK: No kyphosis of the thoracic spine. Symmetric, no curvature, ROM normal, no CVA tenderness, no spinal tenderness   CVS: There is good S1  S2, regular rhythm, murmur present,  2+ pulses symmetric in all extremities.  ABDOMEN: concave and soft, nontender to palpation, non distended, no masses, no organomegaly, good bowel sounds, no rebound or guarding, no peritoneal signs.   EXTREMITIES: No pedal edema, Atraumatic. Full range of motion on both upper and lower extremities, there is no tenderness to palpation, no cyanosis or clubbing, no calf tenderness.  Pulses equal in all extremities, normal cap refill, no joint swelling.  SKIN: Warm and dry, no erythema noted.  Skin color, texture, no rashes or lesions.  NEUROLOGICAL: The patient is oriented to person, place and time, forgetful at times.           LABS:   Recent Results (from the past 168 hour(s))   Urinalysis-UC if Indicated   Result Value Ref Range    Color, UA Yellow Colorless, Yellow, Straw, Light Yellow    Clarity, UA Clear Clear    Glucose, UA Negative Negative    Bilirubin, UA Negative Negative    Ketones, UA Negative Negative    Specific Gravity, UA 1.012 1.001 - 1.030    Blood, UA Negative Negative    pH, UA 7.0 4.5 - 8.0    Protein, UA Negative Negative  mg/dL    Urobilinogen, UA <2.0 E.U./dL <2.0 E.U./dL, 2.0 E.U./dL    Nitrite, UA Negative Negative    Leukocytes, UA Negative Negative   Culture, Urine   Result Value Ref Range    Culture No Growth      Results for orders placed or performed in visit on 07/19/19   Basic Metabolic Panel   Result Value Ref Range    Sodium 136 136 - 145 mmol/L    Potassium 4.2 3.5 - 5.0 mmol/L    Chloride 103 98 - 107 mmol/L    CO2 28 22 - 31 mmol/L    Anion Gap, Calculation 5 5 - 18 mmol/L    Glucose 75 70 - 125 mg/dL    Calcium 8.8 8.5 - 10.5 mg/dL    BUN 17 8 - 28 mg/dL    Creatinine 0.68 (L) 0.70 - 1.30 mg/dL    GFR MDRD Af Amer >60 >60 mL/min/1.73m2    GFR MDRD Non Af Amer >60 >60 mL/min/1.73m2         Lab Results   Component Value Date    WBC 8.2 07/19/2019    HGB 10.7 (L) 07/19/2019    HCT 33.9 (L) 07/19/2019    MCV 91 07/19/2019     07/19/2019       Lab Results   Component Value Date    [id number] 657 07/19/2019     No results found for: HGBA1C  Lab Results   Component Value Date    INR 1.05 03/13/2018     No results found for: [id number]  Lab Results   Component Value Date    TSH 2.04 05/28/2015           ASSESSMENT/PLAN:    1. Chronic bronchiectasis, chronic DAVID: recent pneumonia. F/u with ID prn only if want to treat chronically with prophylactic antibiotics.  Lungs clear, o2 sats stable. Family requesting weekly vitals to monitor closely, avoiding public places, avoiding ill contacts. Hand  in apartment. Avoid sick contacts when possible. No ID follow up at this time, given age need to weigh risk v benefit of long term high dose antibiotic treatment. Monitor for now.   2. HTN: SBP 80-90s. On coreg, lisinopril. Dc lisinopril due to hypotension. Will consider reducing coreg dose if low next week and checking labs. Dizziness with therapy on 8/12. Encouraging fluids.   3. Aortic stenosis, severe mitral valve prolapse, regurgitation: On coreg, lisinopril. No cardiology follow up at this time. Stable, progressing  over time and was just monitoring. Non surgical candidate. DC lisinopril due to hypotension.    4. Overactive bladder: On oxybutynin and now stable. Only 1-2 episodes of nocturia per night. Family requested PSA will check with next hg.   5. Vitamin b 12 deficiency: On vitamin b12. Last b12 level on chart from February 545. Check level on 7/.  6. Anorexia: resolved now. [phone]. Drinking one boost per day, increase fluid intake and increase boost to twice daily. Continue to monitor closely.   7. Anemia of chronic disease: Hg  11.6 on 7/4-10.7 on 7/19. Recheck hg in 4 weeks.   8. Allergic rhinitis: saline nasal spray prn.     Electronically signed by: Estephania Espinosa NP     Total floor/unit time spent 40 min with 25 min spent on counseling and coordination of care. Counseling regarding causes for hypotension and orthostatic hypotension. Counseling regarding anorexia and treatment options. Counseling regarding need for med changes and discussion of further med adjustments and lab monitoring if remains hypotensive. Counseling regarding lab results. Coordinated care with nursing for management of hypotension, anorexia, orthostatic hypotension/presyncope, lab monitoring.

## 2021-06-01 VITALS — WEIGHT: 108.8 LBS | HEIGHT: 66 IN | BODY MASS INDEX: 17.49 KG/M2

## 2021-06-02 NOTE — PROGRESS NOTES
[facility]    DATE: 10/30/2019    NAME:  Sonu Solomon             :  1927  MRN: [id number]  CODE STATUS:  DNR/DNI    VISIT TYPE: Review Of Multiple Medical Conditions     FACILITY:  [facility] [[id number]]       CHIEF COMPLAIN/REASON FOR VISIT:    Chief Complaint   Patient presents with     Review Of Multiple Medical Conditions               HISTORY OF PRESENT ILLNESS: Sonu Solomon is a 89 y.o. male who is a resident of [facility] and will be followed by in house provider service. He has PMH of HTN, chronic DAVID infection, severe mitral valve prolapse, regurgitation, hyponatremia, aortic stenosis.     Today Mr. Solomon is seen for review of systems today. His daughter Aparna Nayak is present today. He says he is doing very well recently and no acute concerns today. He sleeps ok and no pain today. He did have some left wrist pain at times but his daughter recommended soaking it in warm water and he has not complained recently. He still showers himself and his daughters sit outside the bathroom while he does this. He seems to do fine and does not need assistance. They just want to make sure he is ok. He does have dry skin and does use an electric razor to shave. He recently saw the dentist on 10/3 and eye doctor on 10/9. He says he breathes a little harder at times. He has a little cough at times and sometimes will hack up some brown stuff but this is only on occasion. He denies cold symptoms recently or any recent illnesses. He says his memory is not the best but thinks it hasn't changed at all recently. His daughters write out a routine and schedule for him every day to check off everything he needs to do. This keeps him on task. He usually goes to exercises classes around 9 and seems to be attending more activities and keeping busy. He is drinking ensure every day in the morning and sometimes a half in the evenings. We discussed his appetite  and weights. He is eating well and having snacks. They are not sure why his weights continue to drop. He gets up 1-2 times per night to urinate. This seems controlled. He says it is still a problem but his daughter reports it is well controlled. He does often repeat himself during conversation. He does report that he watches what he eats and only eats healthy foods. He was craving some frozen yogurt and had his daughter buy him some fat free frozen yogurt. They tried to convince him to get regular ice cream but he has avoided trans fats for a very long time and still will not eat them. He even shows provider how he reads the food labels. He denies any concerns with his appetite or weight. His daughter reports his wife had heart disease so he was her caretaker and had to choose heart healthy diet foods for them to eat. They deny other concerns with him today. He is not using the tylenol very often.     REVIEW OF SYSTEMS:  PROBLEMS AND REVIEW OF SYSTEMS:   Today on ROS:   Currently, no fever, chills, or rigors. Decreased vision, hearing intact. Denies any chest pain, headaches, palpitations, lightheadedness, dizziness, shortness of breath. Appetite is good. Denies any GERD symptoms. Denies any difficulty with swallowing, nausea, or vomiting.  Denies any abdominal pain, diarrhea or constipation. No insomnia. No active bleeding. No rash. Positive for forgetfulness, urinary frequency, nocturia, incontinence at times but controlle, weight loss    Allergies   Allergen Reactions     Hydrocodone-Acetaminophen Nausea Only     Tree And Shrub Pollen Other (See Comments)     Sneezing from evergreen tree pollen in winter.     Current Outpatient Medications   Medication Sig     acetaminophen (TYLENOL) 500 MG tablet Take 1,000 mg by mouth 3 (three) times a day as needed for pain.     carvedilol (COREG) 6.25 MG tablet Take 1 tablet (6.25 mg total) by mouth 2 (two) times a day with meals.     cyanocobalamin (VITAMIN B-12) 500 MCG  tablet Take 500 mcg by mouth daily.     oxybutynin (DITROPAN XL) 10 MG ER tablet Take 10 mg by mouth daily.     sodium chloride (OCEAN) 0.65 % nasal spray Apply 1 spray into each nostril as needed for congestion.     Past Medical History:    Past Medical History:   Diagnosis Date     Arrhythmia     PAC's     Hypertension      Valvular disease     MVP with mod-severe MR           PHYSICAL EXAMINATION  Vitals:    10/30/19 1729   BP: 118/71   Pulse: 79   Resp: 18   Temp: 97  F (36.1  C)   SpO2: 96%   Weight: 99 lb 6.4 oz (45.1 kg)       Today on physical exam:     GENERAL: Awake, Alert, oriented x3, not in any form of acute distress, answers questions appropriately, follows simple commands, conversant, small frame  HEENT: Head is normocephalic with normal hair distribution. No evidence of trauma. Ears: No acute purulent discharge. Eyes: Conjunctivae pink with no scleral jaundice. Nose: Normal mucosa and septum. NECK: Supple with no cervical or supraclavicular lymphadenopathy. Trachea is midline. [place]  CHEST: No tenderness or deformity, no crepitus  LUNG: dim to auscultation with good chest expansion. There are no crackles or wheezes, normal AP diameter.  BACK: No kyphosis of the thoracic spine. Symmetric, no curvature, ROM normal, no CVA tenderness, no spinal tenderness   CVS: There is good S1  S2, regular rhythm, murmur present,  2+ pulses symmetric in all extremities.  ABDOMEN: concave and soft, nontender to palpation, non distended, no masses, no organomegaly, good bowel sounds, no rebound or guarding, no peritoneal signs.   EXTREMITIES: No pedal edema, Atraumatic. Full range of motion on both upper and lower extremities, there is no tenderness to palpation, no cyanosis or clubbing, no calf tenderness.  Pulses equal in all extremities, normal cap refill, no joint swelling.  SKIN: Warm and dry, no erythema noted.  Skin color, texture, no rashes or lesions.  NEUROLOGICAL: The patient is oriented to person, place and  time, more forgetful today. Repeats self frequently.           LABS:   No results found for this or any previous visit (from the past 168 hour(s)).  Results for orders placed or performed in visit on 07/19/19   Basic Metabolic Panel   Result Value Ref Range    Sodium 136 136 - 145 mmol/L    Potassium 4.2 3.5 - 5.0 mmol/L    Chloride 103 98 - 107 mmol/L    CO2 28 22 - 31 mmol/L    Anion Gap, Calculation 5 5 - 18 mmol/L    Glucose 75 70 - 125 mg/dL    Calcium 8.8 8.5 - 10.5 mg/dL    BUN 17 8 - 28 mg/dL    Creatinine 0.68 (L) 0.70 - 1.30 mg/dL    GFR MDRD Af Amer >60 >60 mL/min/1.73m2    GFR MDRD Non Af Amer >60 >60 mL/min/1.73m2         Lab Results   Component Value Date    WBC 8.2 07/19/2019    HGB 13.4 (L) 08/28/2019    HCT 33.9 (L) 07/19/2019    MCV 91 07/19/2019     07/19/2019       Lab Results   Component Value Date    [id number] 657 07/19/2019     No results found for: HGBA1C  Lab Results   Component Value Date    INR 1.05 03/13/2018     No results found for: [id number]  Lab Results   Component Value Date    TSH 2.04 05/28/2015           ASSESSMENT/PLAN:    1. Chronic bronchiectasis, chronic DAVID: no further pneumonia. F/u with ID prn only if want to treat chronically with prophylactic antibiotics.  Lungs clear, o2 sats stable. Vitals have been stable, o2 sat today 96% on RA. avoiding public places,  ill contacts as much as possible. Hand  in apartment.  No ID follow up at this time, given age need to weigh risk v benefit of long term high dose antibiotic treatment. Monitor for now. Appears to be stable.   2. HTN: SBP 110s. On coreg. Appears improved today and no further dizziness.   3. Aortic stenosis, severe mitral valve prolapse, regurgitation: On coreg. No cardiology follow up at this time. Stable, progressing over time and was just monitoring. Non surgical candidate. No recent concerns.   4. Overactive bladder: On oxybutynin and now stable. Only 1-2 episodes of nocturia per night. No recent  concerns.   5. Vitamin b 12 deficiency: On vitamin b12. Last b12 level on chart from February 545. Check level on 7/. Stable on supplement.   6. Anorexia, Protein calorie Malnutrition: resolved now. [phone]. Drinking one boost per day, sometimes 1/2 in evening.  Healthy diet, avoids all trans fats. Explained today he does not need to avoid these. Family says they have tried to encourage him to eat higher fat foods but he refuses.   7. Anemia of chronic disease: Hg  11.6 on 7/4-10.7 on 7/19. Last hg was 13.4 on 8/28. Stable.   8. Allergic rhinitis: saline nasal spray prn. No recent concerns.     Bmp, hg in august    Electronically signed by: Estephania Espinosa NP     Total floor/unit time spent 40 min with 25 min spent on counseling and coordination of care. Counseling regarding anorexia, weight loss, management of infection risk, bronchiectasis management, oab management. Counseling regarding dietary habits and increasing fat and caloric intake. Counseling regarding safety concerns and increasing physical activity. Coordinated care with nursing for management of anorexia, supplement recs, malnutrition management, lab monitoring, infection risk management.

## 2021-06-03 VITALS
DIASTOLIC BLOOD PRESSURE: 71 MMHG | BODY MASS INDEX: 16.04 KG/M2 | SYSTOLIC BLOOD PRESSURE: 118 MMHG | HEART RATE: 79 BPM | RESPIRATION RATE: 18 BRPM | OXYGEN SATURATION: 96 % | TEMPERATURE: 97 F | WEIGHT: 99.4 LBS

## 2021-06-03 VITALS — WEIGHT: 105 LBS | BODY MASS INDEX: 16.95 KG/M2

## 2021-06-03 VITALS — WEIGHT: 109 LBS | BODY MASS INDEX: 17.59 KG/M2

## 2021-06-03 VITALS — BODY MASS INDEX: 16.46 KG/M2 | WEIGHT: 102 LBS

## 2021-06-04 VITALS
HEART RATE: 77 BPM | TEMPERATURE: 97.1 F | OXYGEN SATURATION: 96 % | RESPIRATION RATE: 16 BRPM | BODY MASS INDEX: 15.95 KG/M2 | SYSTOLIC BLOOD PRESSURE: 126 MMHG | DIASTOLIC BLOOD PRESSURE: 80 MMHG | WEIGHT: 98.8 LBS

## 2021-06-04 VITALS
BODY MASS INDEX: 16.19 KG/M2 | TEMPERATURE: 98.5 F | RESPIRATION RATE: 16 BRPM | OXYGEN SATURATION: 96 % | WEIGHT: 100.3 LBS | DIASTOLIC BLOOD PRESSURE: 82 MMHG | HEART RATE: 76 BPM | SYSTOLIC BLOOD PRESSURE: 137 MMHG

## 2021-06-04 VITALS
WEIGHT: 90 LBS | SYSTOLIC BLOOD PRESSURE: 106 MMHG | OXYGEN SATURATION: 92 % | DIASTOLIC BLOOD PRESSURE: 73 MMHG | RESPIRATION RATE: 18 BRPM | HEART RATE: 100 BPM | BODY MASS INDEX: 14.53 KG/M2 | TEMPERATURE: 97.9 F

## 2021-06-04 NOTE — TELEPHONE ENCOUNTER
[facility] Nurse Triage Telephone Note      Provider: BRENDAN Feng  Facility: [facility]    Facility Type: [facility]    Caller: Tierra   Call Back Number:  [phone]    Allergies: Hydrocodone-acetaminophen and Tree and shrub pollen    Reason for call: Pt has been c/o coccyx pain, the pt is 99lbs and the daughter states that his coccyx bone is oddly shaped and she is requesting an xray of the coccyx.   No  Falls recently or trauma     Verbal Order/Direction given by Provider: offer a pillow under buttocks in w/c or a seat cushion     Provider giving order: MASSIEL Middleton    Verbal order given to: Tierra Fernandez RN

## 2021-06-05 NOTE — PROGRESS NOTES
[facility]    DATE: 2020    NAME:  Sonu Solomon             :  1927  MRN: [id number]  CODE STATUS:  DNR/DNI    VISIT TYPE: Problem Visit (hospital f/u)     FACILITY:  [facility] [[id number]]       CHIEF COMPLAIN/REASON FOR VISIT:    Chief Complaint   Patient presents with     Problem Visit     hospital f/u               HISTORY OF PRESENT ILLNESS: Sonu Solomon is a 89 y.o. male who is a resident of [facility] and will be followed by in house provider service. He has PMH of HTN, chronic DAVID infection, severe mitral valve prolapse, regurgitation, hyponatremia, aortic stenosis.     Today Mr. Solomon is seen for hospital follow up. His daughter is present in his apartment for visit. He was admitted - for gastroenteritis with Nausea, vomiting, and diarrhea. He was treated for JIMENA and CT scan showed enteritis. He required oxygen overnight but was then weaned off. He did have some hypotension and his coreg dose was reduced. He was discharged back to his apartment. He reports today that he feels great. He is back to himself and no concerns of further nausea, vomiting, or diarrhea. He is eating normally. His weight today was 100lbs, which is great for him even with the illness. His bowels are moving normally now. He drinks his boost once a day still. He is sleeping well and has no pain right now. He does still have pain in his right shoulder intermittently. His buttocks pain is much improved since he started sitting on special cushions. He is also wearing the bandages on his bottom to protect the skin. His daughter remarks that her sister Aparna Nayak was requesting a script for these bandages so she could get insurance to cover them. His breathing has been good recently and his o2 sat is 96% on room air. He does not feel short of breath or has been coughing more than baseline. He was given an aerobika inhaler device by the hospital and his  daughter reports they have been encouraging him to use this for exercising his lungs. Otherwise no other recent concerns and he is feeling much better after being in the hospital.     REVIEW OF SYSTEMS:  PROBLEMS AND REVIEW OF SYSTEMS:   Today on ROS:   Currently, no fever, chills, or rigors. Decreased vision, hearing intact. Denies any chest pain, headaches, palpitations, lightheadedness, dizziness, shortness of breath. Appetite is good. Denies any GERD symptoms. Denies any difficulty with swallowing, nausea, or vomiting.  Denies any abdominal pain, diarrhea or constipation. No insomnia. No active bleeding. No rash. Positive for forgetfulness, urinary frequency, nocturia, incontinence at times but controlled, pain in bottom resolved, pain in right arm intermittent    Allergies   Allergen Reactions     Hydrocodone-Acetaminophen Nausea Only     Tree And Shrub Pollen Other (See Comments)     Sneezing from evergreen tree pollen in winter.     Current Outpatient Medications   Medication Sig     acetaminophen (TYLENOL) 500 MG tablet Take 1,000 mg by mouth 3 (three) times a day.     carvediloL (COREG) 3.125 MG tablet Take 1 tablet (3.125 mg total) by mouth 2 (two) times a day with meals.     cyanocobalamin (VITAMIN B-12) 500 MCG tablet Take 500 mcg by mouth daily.     oxybutynin (DITROPAN XL) 10 MG ER tablet Take 10 mg by mouth at bedtime.      sodium chloride (OCEAN) 0.65 % nasal spray Apply 1 spray into each nostril as needed for congestion.     Past Medical History:    Past Medical History:   Diagnosis Date     Arrhythmia     PAC's     Hypertension      Valvular disease     MVP with mod-severe MR           PHYSICAL EXAMINATION  Vitals:    02/05/20 0700   BP: 137/82   Pulse: 76   Resp: 16   Temp: 98.5  F (36.9  C)   SpO2: 96%   Weight: 100 lb 4.8 oz (45.5 kg)       Today on physical exam:     GENERAL: Awake, Alert, oriented x3, not in any form of acute distress, answers questions appropriately, follows simple commands,  conversant, small frame  HEENT: Head is normocephalic with normal hair distribution. No evidence of trauma. Ears: No acute purulent discharge. Eyes: Conjunctivae pink with no scleral jaundice. Nose: Normal mucosa and septum. NECK: Supple with no cervical or supraclavicular lymphadenopathy. Trachea is midline. [place]  CHEST: No tenderness or deformity, no crepitus  LUNG: dim to auscultation with good chest expansion. There are no crackles or wheezes, normal AP diameter.  BACK: No kyphosis of the thoracic spine. Symmetric, no curvature, ROM normal, no CVA tenderness, no spinal tenderness   CVS: There is good S1  S2, regular rhythm, murmur present,  2+ pulses symmetric in all extremities.  ABDOMEN: concave and soft, nontender to palpation, non distended, no masses, no organomegaly, good bowel sounds, no rebound or guarding, no peritoneal signs.   EXTREMITIES: No pedal edema, Atraumatic. Full range of motion on both upper and lower extremities, there is no tenderness to palpation, no cyanosis or clubbing, no calf tenderness.  Pulses equal in all extremities, normal cap refill, no joint swelling.   SKIN: Warm and dry, no erythema noted.    NEUROLOGICAL: The patient is oriented to person, place and time,  forgetful. Repeats self frequently. Appears at baseline today          LABS:   No results found for this or any previous visit (from the past 168 hour(s)).  Results for orders placed or performed during the hospital encounter of 01/30/20   Basic Metabolic Panel   Result Value Ref Range    Sodium 143 136 - 145 mmol/L    Potassium 4.6 3.5 - 5.0 mmol/L    Chloride 105 98 - 107 mmol/L    CO2 26 22 - 31 mmol/L    Anion Gap, Calculation 12 5 - 18 mmol/L    Glucose 128 (H) 70 - 125 mg/dL    Calcium 9.4 8.5 - 10.5 mg/dL    BUN 36 (H) 8 - 28 mg/dL    Creatinine 1.07 0.70 - 1.30 mg/dL    GFR MDRD Af Amer >60 >60 mL/min/1.73m2    GFR MDRD Non Af Amer >60 >60 mL/min/1.73m2         Lab Results   Component Value Date    WBC 6.7  01/30/2020    HGB 13.5 (L) 01/30/2020    HCT 43.0 01/30/2020    MCV 93 01/30/2020     01/30/2020       Lab Results   Component Value Date    [id number] 657 07/19/2019     Lab Results   Component Value Date    HGBA1C 6.1 01/30/2020     Lab Results   Component Value Date    INR 1.05 03/13/2018     No results found for: [id number]  Lab Results   Component Value Date    TSH 2.04 05/28/2015           ASSESSMENT/PLAN:    1. Viral gastroenteritis, JIMENA: Now appears resolved. No further nausea, vomiting, diarrhea and now eating and drinking normally. His skin turgor is good today and has moist mucous membranes. He does not appear dehydrated. We did discuss doing some follow up labs but patient and family feel he is back to normal and no need at this time. Cr 1.07, gfr >60, bun 36 on 1/30.   2. Coccyx pain, risk for pressure ulcer: much improved, using special offloading cushions for chairs and walker. He is wearing protective foam dressing and changing every 3 days and as needed for soiling. Daughter requested script for this and this was given to nursing staff today. No further skin breakdown per patient and family report.   3. Chronic bronchiectasis, chronic DAVID: required o2 when hospitalized overnight. No shortness of breath or cough today. Encouraged to use the aerobika device. Encourage ambulation and sitting upright. No fevers or chills recently.   4. HTN: SBP 130s. On coreg. Dose reduced in patient to 3.125mg two times a day. bp today controlled. Will monitor to see if need to increase once recovered again. No changes today.    5. Aortic stenosis, severe mitral valve prolapse, regurgitation: On coreg. No cardiology follow up at this time. Non surgical candidate. Denies symptoms today of chest pain, shortness of breath.   6. Overactive bladder: On oxybutynin and now stable. Only 1-2 episodes of nocturia per night. Improved on medication. No recent issues.   7. Vitamin b 12 deficiency: On vitamin b12. Last b12  level on chart from February 545. Check level on 7/. Stable on supplement.   8. Anorexia, Protein calorie Malnutrition:  [phone]. Drinking one boost per day, sometimes 1/2 in evening.  Healthy diet, avoids all trans fats. Continue to encourage intake, higher fat diet but declines. Weight steady today despite recent illness. Continue to encourage itnake.   9. Anemia of chronic disease: Hg  13.5 on 1/30, improved.   10. Allergic rhinitis: saline nasal spray prn. No recent concerns.     [facility], 1 1/30    Electronically signed by: Estephania Espinosa NP

## 2021-06-05 NOTE — PROGRESS NOTES
[facility] [place] [facility]    DATE: 2020    NAME:  Sonu Solomon             :  1927  MRN: [id number]  CODE STATUS:  DNR/DNI    VISIT TYPE: Problem Visit (coccyx pain, right elbow and arm pain)     FACILITY:  [facility] [[id number]]       CHIEF COMPLAIN/REASON FOR VISIT:    Chief Complaint   Patient presents with     Problem Visit     coccyx pain, right elbow and arm pain               HISTORY OF PRESENT ILLNESS: Sonu Solomon is a 89 y.o. male who is a resident of [facility] and will be followed by in house provider service. He has PMH of HTN, chronic DAVID infection, severe mitral valve prolapse, regurgitation, hyponatremia, aortic stenosis.     Today Mr. Solomon is seen for coccyx pain, right arm and elbow pain. He is seen in apartment with his daughter Aparna loera. She says last week he complained while they were in the nurses office that his bottom was hurting him. She says this was the first time she had heard him complain. He says today that this has been going on for a while. She says she did look back there at one point and his bone sticks out a lot and he has no fat or padding back there. They did buy him some special seat cushions where the middle back part is cut out so it is offloading pressure on his coccyx. She says this morning he woke up and complained of his right arm and elbow hurting. She says he has a high pain tolerance so it is unusual for him to complain. 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 He says he was told he was fine but his shoulder has bothered him ever since. He says this happened in [place]. He says his right elbow hurts most but the shoulder hurts some too. He says he woke up this morning and it hurt. It especially hurts when he flexes his elbow.  He cannot lift it up either and is needing to use his left arm to raise his right arm. He slept on his back so doesn't think he laid on it. It is not red, warm or swollen at all. In regards to his bottom he says this is better with the new cushions. He has trouble sitting on the chairs at activities though or on his seat on his walker. He can sit on the toilet just fine but anything that puts pressure directly on his bottom hurts. He has not taken anything today for pain or to help his arm. He does have some tylenol in his apartment and we discussed scheduling this. His daughter says he will not remember to take the tylenol if it is not just set up for him. He does well taking his meds and no issues with him following his checklist and schedule. He seems to otherwise be doing well. Aparna Nayak says he has someone come see him 3 times a week. He has not messed up his meds at all. We did discuss using ice for his right elbow, resting his right arm for a week, may use sleeve over elbow for support if needed, and take tylenol twice a day. We discussed maybe doing therapy after a week of rest so will revisit him again next week to check in on pain in coccyx and arm. We also discussed using foam dressings for extra padding over coccyx. He did not have any falls, traumatic events for suspicion of fracture. His daughter was wondering about a compression fracture and we discussed even if he did have compression fracture that low that the only treatment would be pain management and offloading cushion as it is too low for a back brace. We did discuss he has not had any change in bowel or bladder habits to indicate concerns for spine or nerve compression. No numb/tingling recently. Both Oswald and daughter were agreeable to plan of care and all questions answered.     REVIEW OF SYSTEMS:  PROBLEMS AND REVIEW OF SYSTEMS:   Today on ROS:   Currently, no fever, chills, or rigors. Decreased vision, hearing intact. Denies any chest pain,  headaches, palpitations, lightheadedness, dizziness, shortness of breath. Appetite is good. Denies any GERD symptoms. Denies any difficulty with swallowing, nausea, or vomiting.  Denies any abdominal pain, diarrhea or constipation. No insomnia. No active bleeding. No rash. Positive for forgetfulness, urinary frequency, nocturia, incontinence at times but controlled, pain in bottom, right arm pain    Allergies   Allergen Reactions     Hydrocodone-Acetaminophen Nausea Only     Tree And Shrub Pollen Other (See Comments)     Sneezing from evergreen tree pollen in winter.     Current Outpatient Medications   Medication Sig     acetaminophen (TYLENOL) 500 MG tablet Take 1,000 mg by mouth 2 (two) times a day.      carvedilol (COREG) 6.25 MG tablet Take 1 tablet (6.25 mg total) by mouth 2 (two) times a day with meals.     cyanocobalamin (VITAMIN B-12) 500 MCG tablet Take 500 mcg by mouth daily.     oxybutynin (DITROPAN XL) 10 MG ER tablet Take 10 mg by mouth daily.     sodium chloride (OCEAN) 0.65 % nasal spray Apply 1 spray into each nostril as needed for congestion.     Past Medical History:    Past Medical History:   Diagnosis Date     Arrhythmia     PAC's     Hypertension      Valvular disease     MVP with mod-severe MR           PHYSICAL EXAMINATION  Vitals:    01/08/20 1637   BP: 126/80   Pulse: 77   Resp: 16   Temp: 97.1  F (36.2  C)   SpO2: 96%   Weight: (!) 98 lb 12.8 oz (44.8 kg)       Today on physical exam:     GENERAL: Awake, Alert, oriented x3, not in any form of acute distress, answers questions appropriately, follows simple commands, conversant, small frame  HEENT: Head is normocephalic with normal hair distribution. No evidence of trauma. Ears: No acute purulent discharge. Eyes: Conjunctivae pink with no scleral jaundice. Nose: Normal mucosa and septum. NECK: Supple with no cervical or supraclavicular lymphadenopathy. Trachea is midline. [place]  CHEST: No tenderness or deformity, no crepitus  LUNG: dim to  auscultation with good chest expansion. There are no crackles or wheezes, normal AP diameter.  BACK: No kyphosis of the thoracic spine. Symmetric, no curvature, ROM normal, no CVA tenderness, no spinal tenderness   CVS: There is good S1  S2, regular rhythm, murmur present,  2+ pulses symmetric in all extremities.  ABDOMEN: concave and soft, nontender to palpation, non distended, no masses, no organomegaly, good bowel sounds, no rebound or guarding, no peritoneal signs.   EXTREMITIES: No pedal edema, Atraumatic. Full range of motion on both upper and lower extremities, there is no tenderness to palpation, no cyanosis or clubbing, no calf tenderness.  Pulses equal in all extremities, normal cap refill, no joint swelling. Right elbow when flexed severe pain and flexor tendon noted to be spasming, limited ROM of right shoulder and elbow, no erythema, edema, ecchymosis  SKIN: Warm and dry, no erythema noted.  Skin color, texture, no rashes or lesions. Examined coccyx region where pain located, pain located central/midline coccyx, directly under bony prominence, skin is pink/red but no warmth, does bjorn, small callous noted over area and appears irritated  NEUROLOGICAL: The patient is oriented to person, place and time, more forgetful today. Repeats self frequently.           LABS:   No results found for this or any previous visit (from the past 168 hour(s)).  Results for orders placed or performed in visit on 07/19/19   Basic Metabolic Panel   Result Value Ref Range    Sodium 136 136 - 145 mmol/L    Potassium 4.2 3.5 - 5.0 mmol/L    Chloride 103 98 - 107 mmol/L    CO2 28 22 - 31 mmol/L    Anion Gap, Calculation 5 5 - 18 mmol/L    Glucose 75 70 - 125 mg/dL    Calcium 8.8 8.5 - 10.5 mg/dL    BUN 17 8 - 28 mg/dL    Creatinine 0.68 (L) 0.70 - 1.30 mg/dL    GFR MDRD Af Amer >60 >60 mL/min/1.73m2    GFR MDRD Non Af Amer >60 >60 mL/min/1.73m2         Lab Results   Component Value Date    WBC 8.2 07/19/2019    HGB 13.4 (L)  08/28/2019    HCT 33.9 (L) 07/19/2019    MCV 91 07/19/2019     07/19/2019       Lab Results   Component Value Date    IGMRMFWQ11 657 07/19/2019     No results found for: HGBA1C  Lab Results   Component Value Date    INR 1.05 03/13/2018     No results found for: [id number]  Lab Results   Component Value Date    TSH 2.04 05/28/2015           ASSESSMENT/PLAN:    1. Right arm flexor tendon strain: spasms noted on exam when flexing right elbow. Previous right shoulder injury. Pain and limited ROM of both elbow and shoulder. Ordered tylenol two times a day scheduled. Rest for one week, no fitness classes or dumbbell lifting for a week. Ice prn. Will re evaluate next week to determine if will benefit from therapy.   2. Coccyx pain, risk for pressure ulcer: pain in coccyx region, red on exam but does bjorn, small calloused area. No instability noted of coccyx. Pain with direct pressure. Pressure offloading cushions. Will order foam dressing for extra support and skin protection. Change prn. Will eval for therapy next week on follow up. Low suspicion for fracture given no traumatic events recently, no neuro symptoms. Even if did have coccyx or sacral fracture unable to brace at that level, already using pressure offloading cushions. Monitor for worsening. Encourage to be off coccyx region.   3. Chronic bronchiectasis, chronic DAVID: no further pneumonia. F/u with ID prn. No recent illness or concerns. o2 sats have been stable on RA.    4. HTN: SBP 120s. On coreg. Stable.   5. Aortic stenosis, severe mitral valve prolapse, regurgitation: On coreg. No cardiology follow up at this time. Stable, progressing over time and was just monitoring. Non surgical candidate. No recent concerns.   6. Overactive bladder: On oxybutynin and now stable. Only 1-2 episodes of nocturia per night. No recent concerns.   7. Vitamin b 12 deficiency: On vitamin b12. Last b12 level on chart from February 545. Check level on 7/. Stable on  supplement.   8. Anorexia, Protein calorie Malnutrition:  [phone]. Drinking one boost per day, sometimes 1/2 in evening.  Healthy diet, avoids all trans fats. Continue to encourage intake, higher fat diet but refuses.    9. Anemia of chronic disease: Hg  11.6 on 7/4-10.7 on 7/19. Last hg was 13.4 on 8/28. Stable.   10. Allergic rhinitis: saline nasal spray prn. No recent concerns.     Bmp, hg in august    Electronically signed by: Estephania Espinosa NP     Total floor/unit time spent 40 min with 25 min spent on counseling and coordination of care. Counseling regarding pain management, evaluation of coccyx region, pain source, pressure ulcer risk, prevention of further skin break down, right arm evaluation and treatment options, flexor tendon strain. Coordinated care with nursing for management of pressure ulcer risk, pain management.

## 2021-06-08 NOTE — PROGRESS NOTES
[facility]    DATE: 6/10/2020    NAME:  Sonu Solomon             :  1927  MRN: [id number]  CODE STATUS:  DNR/DNI    VISIT TYPE: Problem Visit (hospice admisison, malnutrition, falls)     FACILITY:  [facility] [[id number]]       CHIEF COMPLAIN/REASON FOR VISIT:    Chief Complaint   Patient presents with     Problem Visit     hospice admisison, malnutrition, falls               HISTORY OF PRESENT ILLNESS: Sonu Solomon is a 89 y.o. male who is a resident of [facility]. He has PMH of HTN, chronic DAVID infection, severe mitral valve prolapse, regurgitation, hyponatremia, aortic stenosis.     Today Mr. Solomon is seen for concerns of falls, malnutrition, hospice admission. Nursing staff report he was recently admitted to [facility] hospice. He is seen in his apartment alone today. He was sleeping in his recliner and awoken for visit. He says he has not had any falls recently. He has no headache or pain. His bowels are fine. He says he ate breakfast and lunch already today. He is not tired and feels fine. He is noted to be disheveled and his clothes are dirty and stained. His breakfast is still sitting on his kitchen table and I tried to get him to eat something or drink something but he refused. He appears very tired today and irritable, when usually he is pleasant. He then becomes fixated on his cane and demanding this be found immediately. He had two canes in his kitchen but reported these were not the ones he needed. He even raised his voice at this time. He appears significantly cognitively impaired today and did not seem to recognize me. Nursing staff report they continue to try to encourage him to eat but he is eating very little. His blood pressures have been low at times 70s-100s. They did try using a pediatric cuff on him and these readings were higher than with the adult cuff. He has not complained of dizziness but has had a few falls  . He does complain of pain at times to staff. Called daughter Aparna Nayak and updated on visit today. We reviewed his medications and she would like to stop everything except for his pain and comfort meds. She reported having arranged visiting angels to come in and provide 24 hour care starting today at 1pm. She was hoping someone could come this morning but was told they did not have someone earlier than 1pm. She says the family came and visited him outside on Sunday for his birthday and then the children came yesterday again. She says they have referrals out to two hospice homes [facility] and [facility]. They recently found out that he can have two family members with him at [facility] so they are going to hold out for this hospice home. They want as many family with him at the end as possible. They are going to do the 24 hour care in the facility until he can transfer there. She was told that he had to have a covid test in order to go here and he was tested and was negative, but then she was told by [facility] that since the family was with  Him yesterday and the day before he needs another test in order to be cleared to go. She is wondering if the facility can do this today. She doesn't think he will be going to [facility] for another several days to week. She says their waiting list is longer. She is asking about scheduling pain medicine for him as he will not ask for this. We did discuss this and will schedule dilaudid and keep prn as well. She says when the family saw him they were upset and feel that he looks defeated. She is wondering how long he may have and if they should tell him that he will be moving or wait until the day of. I explained that he was agitated today and irritable and very confused so would wait until closer to when he will be moving as it may contribute to confusion for him. I offered support over the phone and she appreciated the phone call and update.     Called [facility] hospice and discussed plan  of care with Eloisa RN and Kody RN. Discussed plan of care with Assisted living nursing staff as well.     REVIEW OF SYSTEMS:  PROBLEMS AND REVIEW OF SYSTEMS:   Today on ROS:   Currently, no fever, chills, or rigors. Decreased vision, hearing intact. Denies any chest pain. Positive for forgetfulness, urinary frequency, nocturia, incontinence at times but controlled, pain in bottom intermittent, pain in right arm intermittent, confusion, weight loss, poor hygiene, poor intake, falls recently, left forehead abrasions now scabbed, steri strips fallen off, weakness, unable to obtain further ROS due to cognition    Allergies   Allergen Reactions     Hydrocodone-Acetaminophen Nausea Only     Tree And Shrub Pollen Other (See Comments)     Sneezing from evergreen tree pollen in winter.     Current Outpatient Medications   Medication Sig     acetaminophen (TYLENOL) 500 MG tablet Take 1,000 mg by mouth 3 (three) times a day.     atropine 1 % ophthalmic solution Place 2 drops under the tongue every 4 (four) hours as needed (secretions). Indications: secretions     bisacodyL (DULCOLAX) 10 mg suppository Insert 10 mg into the rectum daily as needed (constipation). Indications: constipation     haloperidol (HALDOL) 0.5 mg solutab Place 0.5 mg under the tongue every 4 (four) hours as needed for agitation or nausea. Indications: agiataion, nausea     HYDROmorphone (DILAUDID) 0.5 MG solutab Place 0.5 mg under the tongue every 2 (two) hours as needed for dyspnea or pain. Indications: hospice care     HYDROmorphone (DILAUDID) 0.5 MG solutab Place 0.5 mg under the tongue every 6 (six) hours. Indications: pain/dyspnea     LORazepam (ATIVAN) 0.5 MG tablet Take 0.5 mg by mouth every 4 (four) hours as needed for anxiety. Indications: anxious     senna-docusate (PERICOLACE) 8.6-50 mg tablet Take 1 tablet by mouth daily. Indications: constipation     Past Medical History:    Past Medical History:   Diagnosis Date     Arrhythmia     PAC's      Hypertension      Valvular disease     MVP with mod-severe MR           PHYSICAL EXAMINATION  Vitals:    06/10/20 0700   BP: 110/70   Pulse: 82   Resp: 16   Temp: 97  F (36.1  C)       Today on physical exam:     GENERAL: Lethargic, follows simple commands,  small frame, very cachectic today, unkempt, poor hygiene, stained clothes, irritable today, more confused  HEENT: Head is normocephalic with normal hair distribution. No evidence of trauma. Ears: No acute purulent discharge. Eyes: Conjunctivae pink with no scleral jaundice. Nose: Normal mucosa and septum. NECK: Supple with no cervical or supraclavicular lymphadenopathy. Trachea is midline. [place], glasses, left eyebrow abrasion now scabbed, left face and forehead scabbed  CHEST: No tenderness or deformity, no crepitus  LUNG: dim to auscultation with good chest expansion. There are no crackles or wheezes, normal AP diameter.  BACK: mild kyphosis of the thoracic spine. Symmetric, no curvature, ROM normal, no CVA tenderness, no spinal tenderness   CVS: There is good S1  S2, regular rhythm, murmur present,  2+ pulses symmetric in all extremities.  ABDOMEN: concave and soft, nontender to palpation, non distended, no masses, no organomegaly, good bowel sounds, no rebound or guarding, no peritoneal signs.   EXTREMITIES: No pedal edema, Atraumatic. Full range of motion on both upper and lower extremities, there is no tenderness to palpation, no cyanosis or clubbing, no calf tenderness.  Pulses equal in all extremities, normal cap refill, no joint swelling.   SKIN: Warm and dry, no erythema noted.  Examined coccyx region not examined today, left hand two small skin tears healing, scattered ecchymosis  NEUROLOGICAL: confused, forgetful. Repeats self at times. Speech clear, no facial droop, no aphasia noted, no arm drift or unilateral weakness, cognitive decline but calm, cooperative today          LABS:   Recent Results (from the past 168 hour(s))   COVID-19 Virus PCR MRF     Specimen: Respiratory   Result Value Ref Range    COVID-19 VIRUS SPECIMEN SOURCE Nasopharyngeal     2019-nCOV Not Detected      Results for orders placed or performed during the hospital encounter of 01/30/20   Basic Metabolic Panel   Result Value Ref Range    Sodium 143 136 - 145 mmol/L    Potassium 4.6 3.5 - 5.0 mmol/L    Chloride 105 98 - 107 mmol/L    CO2 26 22 - 31 mmol/L    Anion Gap, Calculation 12 5 - 18 mmol/L    Glucose 128 (H) 70 - 125 mg/dL    Calcium 9.4 8.5 - 10.5 mg/dL    BUN 36 (H) 8 - 28 mg/dL    Creatinine 1.07 0.70 - 1.30 mg/dL    GFR MDRD Af Amer >60 >60 mL/min/1.73m2    GFR MDRD Non Af Amer >60 >60 mL/min/1.73m2         Lab Results   Component Value Date    WBC 6.7 01/30/2020    HGB 13.5 (L) 01/30/2020    HCT 43.0 01/30/2020    MCV 93 01/30/2020     01/30/2020       Lab Results   Component Value Date    [id number] 657 07/19/2019     Lab Results   Component Value Date    HGBA1C 6.1 01/30/2020     Lab Results   Component Value Date    INR 1.05 03/13/2018     No results found for: [id number]  Lab Results   Component Value Date    TSH 2.04 05/28/2015           ASSESSMENT/PLAN:    Hospice consult, failure to thrive, malnutrition, end stage Dementia: severe cognitive decline, self neglect, weight loss, malnutrition, weakness, physical decline. Requiring more assist with ADLs and functioning. Frequent falls recently. Hospice now admitted and following. Dc coreg, azo, oxybutynin, b12 today. Dilaudid scheduled 0.5mg q6h and q2h prn. Daughter requested to keep tylenol scheduled as well. Very minimal intake, family arranged for visiting angels 24 hours. Working on hospice home placement. covid-19 pcr test ordered for today. Supportive cares for family, very tight knit family and will be challenging process for them. Oswald has much support from his family  And would benefit from being with them as much as possible at the end of his life.   Frequent falls: scattered ecchymosis, few falls in last week.  Now will have 24 hour care starting today. Worsening confusion and persistent physical and cognitive decline. Supportive cares. Pain from falls. Changed dilaudid orders today.   Severe protein-calorie malnutrition, weight loss: most recent documented weights [phone] but no recent weight. Continues to have very little oral intake. Staff encouraging but is confused and tells staff he already ate when he has not. Will benefit from hospice services and 24 hour care.   Severe cognitive decline, Alzheimer's dementia, self neglect: concern for self neglect, poor hygiene, unkempt appearance. Dirty clothing, refusing showers, forgetting he has not performed ADLs and refusing for staff to assist with these. Nursing reports hospice aid did try to bathe him the other day and he did refuse. Now has 24 hour care starting, which he will benefit from. Hospice following as well. Supportive cares.   TIAs: no recent concerns, conservative measures.   Stage 1 Pressure injury of coccyx: continue to use pressure offloading cushions for chairs and walker, suspect not remembering to use these. Foam dressing to coccyx.   Chronic bronchiectasis, chronic DAVID: no recent exacerbations or concerns.    HTN: SBP 70-100s. On coreg. Will dc coreg due to hypotension, poor intake, dehydration. Minimal cardiac benefit and on hospice. Comfort focused treatments at this time.   Aortic stenosis, severe mitral valve prolapse, regurgitation: stopped coreg.   Overactive bladder: discussed with rebeca and stopped as feel this is of little benefit and given poor intake unnecessary and can contribute to confusion.   Vitamin b 12 deficiency: stopped supplement.     May transfer to hospice home when arrangements finalized with current meds and treatments.     Electronically signed by: Estephania Espinosa NP     Total floor/unit time spent 68 min with >50% time spent on counseling and coordination of care. Counseling regarding malnutrition management, med  reductions, pain management. Coordinated care with nursing, hospice for care management, pain management, malnutrition management.

## 2021-06-08 NOTE — PROGRESS NOTES
Sonu VILLAREAL (Bob) ( 27), will move from [facility] to [facility] Aparna Nayak's home tomorrow, 2020. Address [address] [place] [id number].  Family will transport.  Nurse visit will be scheduled for Friday 2 PM after pt is settled in dtr's home to teach dtrs about medications and pt cares.  DME will be delivered Thursday afternoon.  Pt will  remain on Ramírez's team through the weekend and will be assess  ed for team assignment on Monday.

## 2021-06-08 NOTE — TELEPHONE ENCOUNTER
[facility] Nurse Triage Telephone Note      Provider: BRENDAN Feng  Facility: [facility]    Facility Type: [facility]    Caller: Latoya  Call Back Number:  [phone]    Allergies: Hydrocodone-acetaminophen and Tree and shrub pollen    Reason for call: Nurse reporting that staff just recently started administering patient's meds.  Patient is upset that he doesn't have orders for Azo Bladder control supplement, which his family had been giving him daily at bedtime.       Verbal Order/Direction given by Provider: Ok for Azo Bladder Control, 1 capsule Q HS.      Provider giving order: BRENDAN Feng    Verbal order given to: Latoya Urbina RN

## 2021-06-08 NOTE — PROGRESS NOTES
[facility]    DATE: 6/3/2020    NAME:  Sonu Solomon             :  1927  MRN: [id number]  CODE STATUS:  DNR/DNI    VISIT TYPE: Problem Visit (fall, weakness, weight loss)     FACILITY:  [facility] [[id number]]       CHIEF COMPLAIN/REASON FOR VISIT:    Chief Complaint   Patient presents with     Problem Visit     fall, weakness, weight loss               HISTORY OF PRESENT ILLNESS: Sonu Solomon is a 89 y.o. male who is a resident of [facility]. He has PMH of HTN, chronic DAVID infection, severe mitral valve prolapse, regurgitation, hyponatremia, aortic stenosis.     Today Mr. Solomon is seen for concerns of fall this morning with left facial abrasions. He was reportedly trying to pick something up and fell forward but it was unwitnessed as he was alone in his apartment. He has had further weight loss and most recent weight was 90lbs. He continues to have poor intake but staff is not exactly sure what or how much he is eating since he is alone and independent for eating. He continues to have weakness and further decline. He was previously evaluated by hospice but family chose to continue with therapy at this time. He is seen today alone in his apartment. He is very pleasantly confused but mental status appears at baseline for him. He reports that he is not having pain in his head where he hit his head. He was not aware he had several abrasions and rug burns on his face. He thinks he is fine. He says he just tripped over forwards today. He was trying to water flowers by the AC unit and had his cane and thinks he tripped on his cane. Noted nursing had applied steri strips to his laceration. He says he bumped his head the other day on the kitchen cabinet because it was open and he was bending over and stood up and bumped it. He is not sure if he still has a bump but says it does not hurt anymore. He thinks it might have bled some but not sure.  He denies falling recently  Other than today. He says he eats well and his appetite is good. He does not feel he is losing weight. He denies any bowel concerns or stomach upset. He says he sleeps well. He sleeps about 7 hours and gets up to pee in the urinal maybe a couple times. He does admit to feeling confused recently. He thinks his bottom is fine and that he changes the dressing every so often. He sits on his cushions to help as well. He shows these to me. He says he has a dry cough at times but no other symptoms. He wants to know when the activities are going to start up again because he misses going to [facility] and seeing the other residents. Per nursing examined his buttocks region today and his pressure injury is improving. He is not changing the bandages like he tells people he is. He has been refusing to bathe or change his clothes. Today on exam I pointed out to him how dirty and stained his clothes were and he says he will change them tonight when he goes to bed. Called daughter Aparna Nayak while on unit to discuss fall and further decline. We reviewed his weights and weight loss and discussed that it is difficult to determine what he is eating since he is alone. We discussed that he has not made much progress with therapy and she says she received an email on Monday from Flor in PT that he was weaker and off balance. Tuesday he was unable to do therapy due to being too tired. She has been discussing with the director here whether he needs higher level of care. She is no longer comfortable with him being in his apartment alone. She wants to know if he should go to the transitional care unit. We discussed that he is not showing progress in his current therapy and this is not due to acute process but rather a chronic and progressive decline. We discussed that he should have increased services but that he remains rather independent for many ADLs. We discussed with adding hospice services he would have more  individuals checking in on him and providing services. We also discussed options for hiring private caregivers if needed as well. She asked whether he would decline further if removed from his apartment and I explained from my observations he struggled with the transition from home to tcu next door and then to assisted living but then became settled and feels this is home. I explained how he was asking about sidney and the other residents and that he would be happier if remained in his apartment with hospice and additional services. She agreed and said that was what she was wondering. I did update her on his appearance and injuries from the fall. She says she thinks he has refused bathing since the lockdown started. She appreciated call and discussion and is going to let hospice know they are ready for them to admit him.     REVIEW OF SYSTEMS:  PROBLEMS AND REVIEW OF SYSTEMS:   Today on ROS:   Currently, no fever, chills, or rigors. Decreased vision, hearing intact. Denies any chest pain, headaches, palpitations, lightheadedness, dizziness, shortness of breath.  Positive for forgetfulness, urinary frequency, nocturia, incontinence at times but controlled, pain in bottom intermittent, pain in right arm intermittent, confusion, weight loss, poor hygiene, poor intake, fall today, left forehead abrasions, weakness, unable to obtain further ROS due to cognition    Allergies   Allergen Reactions     Hydrocodone-Acetaminophen Nausea Only     Tree And Shrub Pollen Other (See Comments)     Sneezing from evergreen tree pollen in winter.     Current Outpatient Medications   Medication Sig     acetaminophen (TYLENOL) 500 MG tablet Take 1,000 mg by mouth 3 (three) times a day.     carvediloL (COREG) 3.125 MG tablet 1 TAB BY MOUTH TWICE DAILY     cyanocobalamin (VITAMIN B-12) 500 MCG tablet Take 500 mcg by mouth daily.     oxybutynin (DITROPAN XL) 10 MG ER tablet Take 10 mg by mouth at bedtime.      pumpkin seed extract/soy germ  (AZO BLADDER CONTROL ORAL) Take 1 capsule by mouth at bedtime.     Past Medical History:    Past Medical History:   Diagnosis Date     Arrhythmia     PAC's     Hypertension      Valvular disease     MVP with mod-severe MR           PHYSICAL EXAMINATION  Vitals:    06/03/20 0700   BP: 106/73   Pulse: 100   Resp: 18   Temp: 97.9  F (36.6  C)   SpO2: 92%   Weight: (!) 90 lb (40.8 kg)       Today on physical exam:     GENERAL: Awake, alert, follows simple commands,  small frame, very cachectic today, unkempt, poor hygiene, stained clothes, calm, cooperative  HEENT: Head is normocephalic with normal hair distribution. No evidence of trauma. Ears: No acute purulent discharge. Eyes: Conjunctivae pink with no scleral jaundice. Nose: Normal mucosa and septum. NECK: Supple with no cervical or supraclavicular lymphadenopathy. Trachea is midline. [place], glasses, left eyebrow abrasion with steri strips, left face and forehead denuded areas (rug burns)  CHEST: No tenderness or deformity, no crepitus  LUNG: dim to auscultation with good chest expansion. There are no crackles or wheezes, normal AP diameter.  BACK: mild kyphosis of the thoracic spine. Symmetric, no curvature, ROM normal, no CVA tenderness, no spinal tenderness   CVS: There is good S1  S2, regular rhythm, murmur present,  2+ pulses symmetric in all extremities.  ABDOMEN: concave and soft, nontender to palpation, non distended, no masses, no organomegaly, good bowel sounds, no rebound or guarding, no peritoneal signs.   EXTREMITIES: No pedal edema, Atraumatic. Full range of motion on both upper and lower extremities, there is no tenderness to palpation, no cyanosis or clubbing, no calf tenderness.  Pulses equal in all extremities, normal cap refill, no joint swelling.   SKIN: Warm and dry, no erythema noted.  Examined coccyx region not examined today, left hand two small skin tears  NEUROLOGICAL: confused, forgetful. Repeats self at times. Speech clear, no facial droop,  no aphasia noted, no arm drift or unilateral weakness, cognitive decline but calm, cooperative today          LABS:   No results found for this or any previous visit (from the past 168 hour(s)).  Results for orders placed or performed during the hospital encounter of 01/30/20   Basic Metabolic Panel   Result Value Ref Range    Sodium 143 136 - 145 mmol/L    Potassium 4.6 3.5 - 5.0 mmol/L    Chloride 105 98 - 107 mmol/L    CO2 26 22 - 31 mmol/L    Anion Gap, Calculation 12 5 - 18 mmol/L    Glucose 128 (H) 70 - 125 mg/dL    Calcium 9.4 8.5 - 10.5 mg/dL    BUN 36 (H) 8 - 28 mg/dL    Creatinine 1.07 0.70 - 1.30 mg/dL    GFR MDRD Af Amer >60 >60 mL/min/1.73m2    GFR MDRD Non Af Amer >60 >60 mL/min/1.73m2         Lab Results   Component Value Date    WBC 6.7 01/30/2020    HGB 13.5 (L) 01/30/2020    HCT 43.0 01/30/2020    MCV 93 01/30/2020     01/30/2020       Lab Results   Component Value Date    [id number] 657 07/19/2019     Lab Results   Component Value Date    HGBA1C 6.1 01/30/2020     Lab Results   Component Value Date    INR 1.05 03/13/2018     No results found for: [id number]  Lab Results   Component Value Date    TSH 2.04 05/28/2015           ASSESSMENT/PLAN:    Fall, Left forehead laceration, facial abrasions and friction burns, left hand skin tears: steri strips, bacitracin were applied. Fall this am while watering flowers near the AC unit and tripped on cane. Appears mechanical. Continues to be weak and physically declining despite therapy. Discussed hospice again today. Need to increase services, possibly add private caregivers depending how he does with hospice services. Discussed with nursing more frequent checks and fall prevention management.   Severe protein-calorie malnutrition, weight loss: most recent documented weights [id number].  Encourage protein shakes multiple times per day. Unclear how often he is drinking these. Unclear how much he is eating as continues to lose weight and is  independent for feeding. Lost 18lbs over last several months. appropriate for hospice at this time.   Severe cognitive decline, Alzheimer's dementia, self neglect: concern for self neglect, poor hygiene, unkempt appearance. Dirty clothing, refusing showers, forgetting he has not performed ADLs and refusing for staff to assist with these. Discussed increasing services again today with daughter. Appropriate for hospice at this time.   TIAs: intermittent slurred speech, no aphasia, facial droop noted, no unilateral weakness. Increased confusion at times. Previously decided on conservative treatments per discussion with daughter. Hospice consult.   Stage 1 Pressure injury of coccyx: continue to use pressure offloading cushions for chairs and walker, suspect not remembering to use these. Foam dressing to coccyx but unclear how often being changed. Nursing reports changing this today and improved appearance. He tells staff he is changing the dressing but he appears to not be.   Chronic bronchiectasis, chronic DAVID: no recent exacerbations or concerns.    HTN: SBP 100s. On coreg. Stable today.   Aortic stenosis, severe mitral valve prolapse, regurgitation: On coreg. No recent complaints of chest pain, shortness of breath, but poor historian and lives alone.   Overactive bladder: On oxybutynin. Unclear how stable this is as he is poor historian. Stained underwear.   Vitamin b 12 deficiency: On vitamin b12.  Hospice consult, failure to thrive, malnutrition, end stage Dementia: severe cognitive decline, self neglect, weight loss, malnutrition, weakness, physical decline. Requiring more assist with ADLs and functioning. Fall today. Further weight loss 108-90lbs over last few months. Discussed with daughter today and very appropriate for hospice at this time. Updated hospice team.     Electronically signed by: Estephania Espinosa NP       Total floor/unit time spent 62 min with >50% min spent on counseling and coordination of care.  Counseling regarding malnutrition, fall management. Coordinated care with nursing and hospice for hospice admission, malnutrition, physical decline, cognitive decline, fall prevention management.

## 2021-06-08 NOTE — PROGRESS NOTES
[facility]    DATE: 2020    NAME:  Sonu Solomon             :  1927  MRN: [id number]  CODE STATUS:  DNR/DNI    VISIT TYPE: Problem Visit (malnutrition, weight loss, cognitive decline)     FACILITY:  [facility] [[id number]]       CHIEF COMPLAIN/REASON FOR VISIT:    Chief Complaint   Patient presents with     Problem Visit     malnutrition, weight loss, cognitive decline               HISTORY OF PRESENT ILLNESS: Sonu Solomon is a 89 y.o. male who is a resident of [facility]. He has PMH of HTN, chronic DAVID infection, severe mitral valve prolapse, regurgitation, hyponatremia, aortic stenosis.     Today Mr. Solomon is seen for concerns of malnutrition, weight loss, cognitive decline. His family  Has been very concerned about how he has been doing since the lock down. He seems more confused on the phone and has not been taking his medications. They are worried he is not eating and losing weight, since he required a lot of cuing and reminders for this before and they are not able to provide this anymore. They have been adding nursing services on but still worried about how he is doing. He has not been showering, performing ADLS, changing his clothes, or providing self cares. Nursing reports when they offer to help with these cares he usually refuses. He has been noted to be disheveled and unkempt on appearance from nursing and the resident assistants. On exam today he is seen in his apartment and I called his daughter Aparna Nayak and had her on speakerphone for exam. On appearance Oswald is wearing a dirty shirt, soiled pants. The pants are the same pants he has worn for previous visits but they are so baggy on him he is wearing suspenders to keep them up. He says he was just about to have lunch but does not have any food out. He says he is eating but is not able to tell me what he had today to eat or what he ate yesterday. He says he is fine. He  is not able to tell me how often he drinks his protein shakes and his daughter thinks based on how often they are bringing them in and what is left maybe at most once a day. He says he is taking his meds and Aparna Nayak reminds him he has not been taking them. He says he is fine. Aparna Nayak says he just started therapy last week and this is helpful because he likes the interaction and is walking more. Before this he was not walking much or getting much physical activity. He was weaker before he started therapy as well. He denies any headaches or pain. He does say his butt does still hurt on occasion but is using his cushions. Aparna Nayak requests his bottom be looked at today because she is worried he is not changing the bandage and if the area has developed into a wound or is progressing. Aparna nayak notes he has been slurring his speech at times on the phone and she and her sisters are worried he may  Be having TIAs. We discussed the risks v benefits of treating this. We discussed starting him on aspirin would increase risk of bleeding and not provide much immediate benefits. Starting a statin is more of a 10 year outlook, which we do not expect his life expectancy to be that long. Aparna Nayak says she and her family cry every day and are very worried about him. They feel he has really declined and are interested in discussing hospice. They know if he becomes sick he doesn't have a chance to fight it off with his DAVID and they do not want him to go to the hospital for any reason. They would like him to be comfortable and die in his apartment/home. Discussed with her that he may be difficult to qualify based on diagnosis, but will put in the consult and see what hospice says. She says they do not have a preference as to what company it is. She agreed to [facility] hospice. Oswald fell asleep while speaking with Aparna Nayak and mentioned this to her and she says that he has been doing this a lot more frequently as well. He seems to have  physically declined. His last weight was 96lbs but that was several weeks ago and Aparna Nayak and I discussed this is a weight loss but he looks like he has lost more. I will ask staff to weigh him again today. Aparna Nayak appreciated the visit and phone discussion. Called [facility] hospice and notified them of consult. I explained his recent decline, medical history, concerns for cognitive decline, weight loss, malnutrition. I then updated nursing and director of nursing at [facility] of consult.      REVIEW OF SYSTEMS:  PROBLEMS AND REVIEW OF SYSTEMS:   Today on ROS:   Currently, no fever, chills, or rigors. Decreased vision, hearing intact. Denies any chest pain, headaches, palpitations, lightheadedness, dizziness, shortness of breath.  Positive for forgetfulness, urinary frequency, nocturia, incontinence at times but controlled, pain in bottom intermittent, pain in right arm intermittent, increased confusion today, weight loss, unable to obtain further ROS due to cognition    Allergies   Allergen Reactions     Hydrocodone-Acetaminophen Nausea Only     Tree And Shrub Pollen Other (See Comments)     Sneezing from evergreen tree pollen in winter.     Current Outpatient Medications   Medication Sig     acetaminophen (TYLENOL) 500 MG tablet Take 1,000 mg by mouth 3 (three) times a day.     carvediloL (COREG) 3.125 MG tablet 1 TAB BY MOUTH TWICE DAILY     cyanocobalamin (VITAMIN B-12) 500 MCG tablet Take 500 mcg by mouth daily.     oxybutynin (DITROPAN XL) 10 MG ER tablet Take 10 mg by mouth at bedtime.      Past Medical History:    Past Medical History:   Diagnosis Date     Arrhythmia     PAC's     Hypertension      Valvular disease     MVP with mod-severe MR           PHYSICAL EXAMINATION  Vitals:    05/15/20 0700   BP: 120/66   Pulse: (!) 59   Resp: 13   Temp: 97.1  F (36.2  C)   SpO2: 95%       Today on physical exam:     GENERAL: Awake at times but dozes during conversation, not in any form of acute  distress, answers only few questions appropriately, follows simple commands,  small frame, very cachectic today, unkempt, poor hygiene, stained clothes  HEENT: Head is normocephalic with normal hair distribution. No evidence of trauma. Ears: No acute purulent discharge. Eyes: Conjunctivae pink with no scleral jaundice. Nose: Normal mucosa and septum. NECK: Supple with no cervical or supraclavicular lymphadenopathy. Trachea is midline. [place], glasses  CHEST: No tenderness or deformity, no crepitus  LUNG: dim to auscultation with good chest expansion. There are no crackles or wheezes, normal AP diameter.  BACK: mild kyphosis of the thoracic spine. Symmetric, no curvature, ROM normal, no CVA tenderness, no spinal tenderness   CVS: There is good S1  S2, regular rhythm, murmur present,  2+ pulses symmetric in all extremities.  ABDOMEN: concave and soft, nontender to palpation, non distended, no masses, no organomegaly, good bowel sounds, no rebound or guarding, no peritoneal signs.   EXTREMITIES: No pedal edema, Atraumatic. Full range of motion on both upper and lower extremities, there is no tenderness to palpation, no cyanosis or clubbing, no calf tenderness.  Pulses equal in all extremities, normal cap refill, no joint swelling.   SKIN: Warm and dry, no erythema noted.  Examined coccyx region and now has stage 1 pressure injury, non blanchable erythema, noted to have multiple pairs of underwear on and are stained with urine and stool  NEUROLOGICAL: very confused today, forgetful. Repeats self frequently. Intermittent slurred speech, no facial droop, no aphasia noted, no arm drift or unilateral weakness, significant cognitive decline          LABS:   No results found for this or any previous visit (from the past 168 hour(s)).  Results for orders placed or performed during the hospital encounter of 01/30/20   Basic Metabolic Panel   Result Value Ref Range    Sodium 143 136 - 145 mmol/L    Potassium 4.6 3.5 - 5.0 mmol/L     Chloride 105 98 - 107 mmol/L    CO2 26 22 - 31 mmol/L    Anion Gap, Calculation 12 5 - 18 mmol/L    Glucose 128 (H) 70 - 125 mg/dL    Calcium 9.4 8.5 - 10.5 mg/dL    BUN 36 (H) 8 - 28 mg/dL    Creatinine 1.07 0.70 - 1.30 mg/dL    GFR MDRD Af Amer >60 >60 mL/min/1.73m2    GFR MDRD Non Af Amer >60 >60 mL/min/1.73m2         Lab Results   Component Value Date    WBC 6.7 01/30/2020    HGB 13.5 (L) 01/30/2020    HCT 43.0 01/30/2020    MCV 93 01/30/2020     01/30/2020       Lab Results   Component Value Date    [id number] 657 07/19/2019     Lab Results   Component Value Date    HGBA1C 6.1 01/30/2020     Lab Results   Component Value Date    INR 1.05 03/13/2018     No results found for: [id number]  Lab Results   Component Value Date    TSH 2.04 05/28/2015           ASSESSMENT/PLAN:    Severe protein-calorie malnutrition, weight loss: most recent documented weights [id number]. Requested staff weigh him today suspect further weight loss, clothes very loose fitting, needing suspenders to keep pants on. Encourage protein shakes multiple times per day. Unclear how often he is drinking these.   Severe cognitive decline, Alzheimer's dementia, self neglect: concern for self neglect, poor hygiene, unkempt appearance. Dirty clothing, refusing showers, forgetting he has not performed ADLs and refusing for staff to assist with these. Increasing services in [place] but often refusing assistance.   TIAs: intermittent slurred speech, no aphasia, facial droop noted, no unilateral weakness. Concerns for increased confusion. Discussed risk v benefit of treatment and will defer at this time. Hospice consult.   Stage 1 Pressure injury of coccyx: continue to use pressure offloading cushions for chairs and walker, suspect not remembering to use these. Foam dressing to coccyx but unclear how often being changed. Changed today on exam. Now non blanchable erythema.    Chronic bronchiectasis, chronic DAVID: no recent exacerbations or concerns.     HTN: SBP 120s. On coreg. Stable today.   Aortic stenosis, severe mitral valve prolapse, regurgitation: On coreg. No recent complaints of chest pain, shortness of breath, but poor historian and lives alone.   Overactive bladder: On oxybutynin. Unclear how stable this is as he is poor historian. Stained underwear.   Vitamin b 12 deficiency: On vitamin b12.  Hospice consult, failure to thrive, malnutrition, end stage Dementia: severe cognitive decline, self neglect, weight loss, malnutrition. Discussed with family, would like to avoid hospitalization if becomes ill. Discussed case with hospice who will review and determine if eligible. Requested staff weigh him today. Supportive cares.     Electronically signed by: Estephania Espinosa NP       Total floor/unit time spent 68 min with >50% time spent on counseling and coordination of care. Counseling regarding malnutrition, cognitive decline. Coordinated care with nursing, hospice for malnutrition, dementia management and hospice consult.

## 2021-06-10 NOTE — PROGRESS NOTES
ASSESSMENT/PLAN:  1. Hemoptysis  89-year-old gentleman with hemoptysis and unintentional weight loss.  CBC was unremarkable.  Will check a BMP, QuantiFERON tuberculosis, and chest/abdomen CT. he does not endorse signs or symptoms consistent with gastrointestinal (hematemesis, melena or hematochezia).  He is to call if he has recurrent of symptoms.  He verbalized understanding and agreed with plan  - HM2(CBC w/o Differential)  - CT Chest Abdomen Pelvis With Oral WO IV; Future  - Basic Metabolic Panel  - QTF-Mycobacterium tuberculosis by QuantiFERON-TB Gold    2. HTN (hypertension)  Pressure is stable with lisinopril and carvedilol.  He has an upcoming appointment with his cardiologist.      Orders Placed This Encounter   Procedures     CT Chest Abdomen Pelvis With Oral WO IV     Standing Status:   Future     Standing Expiration Date:   5/22/2018     Order Specific Question:   Can the procedure be changed per Radiologist protocol?     Answer:   Yes     Order Specific Question:   If this is a diagnostic procedure, have the patient's age and recent imaging history been considered?     Answer:   Yes     HM2(CBC w/o Differential)     Basic Metabolic Panel     QTF-Mycobacterium tuberculosis by QuantiFERON-TB Gold           CHIEF COMPLAINT:  Chief Complaint   Patient presents with     Cough     coughing up blood x last night and today     Fatigue     running nose       HISTORY OF PRESENT ILLNESS:  Sonu is a 89 y.o. male presenting to the clinic today for a cough. Yesterday, for breakfast, he had craisins and strawberries in his oatmeal. He has had some ongoing URI symptoms including rhinorrhea and cough for the past few months. He feels like he has some seasonal allergies. After breakfast, he does what he describes as spitting up a bit, and it had a red tint. He had the same thing for breakfast today, and he spit up, and he coughed. The cough was productive, and he noticed spots of blood in the sputum. His cough feels  like it is a post nasal drip causing cough. He has been using a nasal spray daily. Denies difficulties swallowing. Denies chest pain or night sweats. 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 He was also in the  and traveled. He notes that since he has retired over 30 years, that he has last 1 pound per year, and he says that he has a hard time gaining weight. He does have a history of smoking a pipe 30-40 years ago, for about one year. He was not a regular pipe smoker. Noting some fatigue. Denies abdominal pain.     Hypertension: His blood pressure is elevated at 154/100. He does take lisinopril 5mg and carvedilol for her hypertension. He does check his blood pressures daily, and it is usually systolic of 1teens-120s. He does follow with a cardiologist.       REVIEW OF SYSTEMS:   No pain with swallowing. Bowel pattern is unchanged. Denies hematochezia, hematuria. No mouth sores. All other systems are negative.    PFSH:  No new history.     TOBACCO USE:  History   Smoking Status     Former Smoker   Smokeless Tobacco     Not on file       VITALS:  Vitals:    05/22/17 1508 05/22/17 1538   BP: (!) 150/104 132/88   Patient Site: Left Arm    Patient Position: Sitting    Cuff Size: Adult Regular    Pulse: 82    SpO2: 97%    Weight: 115 lb (52.2 kg)      Wt Readings from Last 3 Encounters:   05/22/17 115 lb (52.2 kg)   05/10/16 120 lb (54.4 kg)   05/01/15 128 lb 6.4 oz (58.2 kg)       PHYSICAL EXAM:  Constitutional: Patient is oriented to person, place, and time. Patient appears well-developed and well-nourished. No distress.   Head: Normocephalic and atraumatic.   Right Ear: External ear normal.   Left Ear: External ear normal.   Nose: Nose normal.   Mouth/Throat: Oropharynx is clear and moist. No oropharyngeal exudate.   Cardiovascular: Normal rate, regular rhythm, normal heart sounds and intact distal pulses. Murmur heard at left sternal boarder.   Pulmonary/Chest: Effort  normal and breath sounds normal. No stridor. No respiratory distress. Patient has no wheezes, no rales, exhibits no tenderness.   Abdominal: Soft. Bowel sounds are normal. Patient exhibits no distension and no mass. There is no tenderness. There is no rebound and no guarding.   Neurological: Patient is alert and oriented to person, place, and time. Patient has normal reflexes. No cranial nerve deficit. Coordination normal.   Skin: Skin is warm and dry. No rash noted. Patient is not diaphoretic. No erythema. No pallor.    Results for orders placed or performed in visit on 05/22/17   HM2(CBC w/o Differential)   Result Value Ref Range    WBC 5.5 4.0 - 11.0 thou/uL    RBC 4.70 4.40 - 6.20 mill/uL    Hemoglobin 14.0 14.0 - 18.0 g/dL    Hematocrit 41.7 40.0 - 54.0 %    MCV 89 80 - 100 fL    MCH 29.7 27.0 - 34.0 pg    MCHC 33.6 32.0 - 36.0 g/dL    RDW 12.2 11.0 - 14.5 %    Platelets 210 140 - 440 thou/uL    MPV 7.3 7.0 - 10.0 fL           ADDITIONAL HISTORY SUMMARIZED (2): Reviewed cardiology office visit from 5/10/2016.  DECISION TO OBTAIN EXTRA INFORMATION (1): None.   RADIOLOGY TESTS (1): Ordered abdominal CT  LABS (1): Ordered labs today.  MEDICINE TESTS (1): None.  INDEPENDENT REVIEW (2 each): None.     ILisa, am scribing for and in the presence of, Dr. Sanders.    IDr. Sanders, personally performed the services described in this documentation, as scribed by Lisa Estrada in my presence, and it is both accurate and complete.    MEDICATIONS:  Current Outpatient Prescriptions   Medication Sig Dispense Refill     aspirin 81 mg chewable tablet Chew 81 mg daily.       carvedilol (COREG) 6.25 MG tablet TAKE 1 TABLET TWICE DAILY WITH MEALS 180 tablet 0     cyanocobalamin (VITAMIN B-12) 500 MCG tablet Take 500 mcg by mouth daily.       lisinopril (PRINIVIL,ZESTRIL) 5 MG tablet TAKE 1 TABLET EVERY DAY 90 tablet 2     multivitamin with minerals (THERA-M) 9 mg iron-400 mcg Tab tablet Take 1 tablet by  mouth daily.       No current facility-administered medications for this visit.        Total data points: 4

## 2021-06-11 NOTE — PROGRESS NOTES
[facility]   Outpatient Consult Note    ASSESSMENT  Nodular bronchiectasis which certainly can be associated with chronic mycobacterial infection, such as DAVID  He had one episode of blood-streaked sputum which could have well been related to upper respiratory irritation in the context of aspirin intake  To make the diagnosis we need sputum AFB cultures  Note that the decision to treat pulmonary DAVID has to be individualized and that given the significant proportion of patients do not tolerate treatment, which has to be in the form of 3 antibiotics over the course of 12 or 1-2 years a significant proportion of patients she was not to be treated given the slowly evolving nature of this infection  With Sonu, his symptoms include seasonal cough that appears to be more due to seasonal rhinitis whether allergic or not  He continues to be active, and reports only some fatigue in the morning.  He does have mitral regurgitation which can add to his symptoms.  Former pipe smoker    PLAN    Above was discussed in detail with the patient and his 2 daughters (one of whom is a nurse).  We will go ahead and order 3 sputum AFBs  I asked him to consider empirical Claritin or nasal Flonase  Follow-up CT chest in 6-8 weeks recommended  We can then discuss further management, if any,  per the results of the above  (PS: DAVID is not 'atypical tb', and such terminology is discouraged)    Kanika Dejesus M.D.          HPI  89 years old male with a history of mitral regurgitation, who reports that on May 22 he saw a little blood in sputum expectorated.  He has not seen any more blood since then.  He was not having at the time worsening of cough or fevers or chills.  Otherwise he reports to be happy with his general health except for some morning fatigue.  He is able to walk 1 mile briskly without problems.  No night sweats.  He does report a slow weight loss about 1 pound a year over the last 30 years.  No  hematochezia.  He says he eats well.  Thyroid testing was normal about 2 years ago.  Endoscopy several years ago was okay according to family.  Also he was seen by his primary doctor referred him after CT chest came back positive for bronchiectasis as below, Concerning for mycobacterial infection.  QuantiFERON gold came back negative CBC BMP normal.  No history of tuberculosis.  Patient also was seen by cardiology who ordered follow-up echocardiogram.  Otherwise no orthopnea or leg swelling.            ROS  All systems reviewed, negative except for the above    Current Outpatient Prescriptions on File Prior to Visit   Medication Sig Dispense Refill     aspirin 81 mg chewable tablet Chew 81 mg daily.       carvedilol (COREG) 6.25 MG tablet TAKE 1 TABLET TWICE DAILY WITH MEALS 180 tablet 0     cyanocobalamin (VITAMIN B-12) 500 MCG tablet Take 500 mcg by mouth daily.       lisinopril (PRINIVIL,ZESTRIL) 5 MG tablet TAKE 1 TABLET EVERY DAY 90 tablet 2     multivitamin with minerals (THERA-M) 9 mg iron-400 mcg Tab tablet Take 1 tablet by mouth daily.       No current facility-administered medications on file prior to visit.        Allergies   Allergen Reactions     Hydrocodone-Acetaminophen Nausea Only     Tree And Shrub Pollen Other (See Comments)     Sneezing from evergreen tree pollen in winter.     Social history  Former pipe smoker.  No alcoholism or illicit drug use  Social History     Social History     Marital status:      Spouse name: N/A     Number of children: N/A     Years of education: N/A     Occupational History     Not on file.     Social History Main Topics     Smoking status: Former Smoker     Smokeless tobacco: Not on file     Alcohol use Not on file     Drug use: Not on file     Sexual activity: Not on file     Other Topics Concern     Not on file     Social History Narrative       No family history on file.      PHYSICAL EXAM  /80  Pulse 72  Temp 97.4  F (36.3  C)  Wt 113 lb 4.8 oz  (51.4 kg)  BMI 18.29 kg/m2    General Appearance:  Alert, cooperative, no distress, appears stated age    Head:  Normocephalic, without obvious abnormality, atraumatic   Neck no stiffness or adenopathy  Eyes no conjunctivitis or icterus   Oral cavity no thrush , ulcers or exudates    Lungs:  Clear to auscultation bilaterally    Chest Wall:  No tenderness or deformity    Heart:  Regular rate and rhythm, ADAM    Abdomen:  Soft, non-tender, bowel sounds active , no masses, no organomegaly    Extremities:  Extremities normal, atraumatic, no cyanosis or edema,     Skin:  Skin color, texture, turgor normal, no rashes or lesions    Neurologic:  Alert and oriented X 3, Moves all 4 extremities      DATA  Results for orders placed or performed in visit on 06/07/17   ECG Clinic - Today   Result Value Ref Range    SYSTOLIC BLOOD PRESSURE  mmHg    DIASTOLIC BLOOD PRESSURE  mmHg    VENTRICULAR RATE 64 BPM    ATRIAL RATE 64 BPM    P-R INTERVAL 158 ms    QRS DURATION 98 ms    Q-T INTERVAL 436 ms    QTC CALCULATION (BEZET) 449 ms    P Axis 92 degrees    R AXIS 80 degrees    T AXIS 13 degrees    MUSE DIAGNOSIS       Sinus rhythm with frequent and consecutive Premature atrial complexes  Incomplete right bundle branch block  Borderline ECG  When compared with ECG of 10-MAY-2016 08:53,  No significant change was found  Confirmed by VIELKA OLIVER MD LOC: ([id number]) on 6/7/2017 3:30:35 PM         RADIOLOGY  5/23/2017 8:11 AM      INDICATION: Unintentional weight loss. Hemoptysis. Hematemesis and melena.  TECHNIQUE: CT chest, abdomen, and pelvis. Dose reduction techniques were used.   IV CONTRAST: Iohexol (Omni) 100 mL.  COMPARISON: CT abdomen 08/31/2006.     FINDINGS:   CHEST: Mild cylindrical bronchiectasis throughout both lungs most prominent in the lower lobes also involving the upper lobes and right middle lobe. Scattered areas of mucous plugging. Associated nodular infiltrates in both lungs, some very tiny and some   larger. The  largest in the right upper lobe measures 1.7 x 0.9 cm. 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 Recommend short interval follow-up to ensure that some of the more prominent nodules do not   enlarge. Difficult to exclude an underlying malignancy although most likely all infectious.     No mediastinal or hilar lymphadenopathy.      ABDOMEN: No significant findings in the liver, spleen, kidneys, pancreas, and adrenal glands. No lymphadenopathy.     PELVIS: Prostatic seed implants. Otherwise negative.     MUSCULOSKELETAL: Negative.     IMPRESSION:   CONCLUSION:  1.  Diffuse cylindrical bronchiectasis with mucous plugging and associated mild nodular infiltrates in both lungs. 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     2.  Recommend short interval follow-up in 3 months.      3.  No significant findings within the abdomen and pelvis.

## 2021-06-12 NOTE — PROGRESS NOTES
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  He does have mitral regurgitation which can add to his symptoms.  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  We again discussed the pathophysiology and the nature of MAC infection with the patient and his 3 daughters.  Really the patient is not changed and does not feel that he is declining.  He denies coughing or worsening shortness of breath.  Therefore the risk benefit of treatment of this very slow moving pulmonary infection does not justify starting him on 3 antibiotics for many months with potential side effects and drug interactions.  We will obtain a follow-up CT.  Afterwards it  is probably best to monitor clinically at which point if worsening another discussion about the risk-benefit of treatment should happen.  Also note that if there is a change , then cardiac reevaluation would be in order to evaluate for worsening mitral regurgitation.  Please call us with any questions.  Patient and family verbalized understanding.    Kanika Dejesus M.D.      Present Illness:   He had nodular bronchiectasis on a prior CT chest last visit I order 3 sputum AFB cultures and 3 of them are positive for AFB identified as MAC by Line probe assay at [facility].  Clinically he continues to feel fine.  He does not feel that he has declined.  He continues to be active doing his daily routine.  No fever no chills no night sweats no weight loss no coughing no chest pain no hemoptysis no leg swelling no orthopnea.  He also had a follow-up with  cardiology ordered a follow-up echocardiogram which showed again some mitral regurgitation but stable, moderate concentric increase in left ventricular wall thickness.        ECHO  1. Normal left ventricular size and systolic performance with a visually estimated ejection fraction of 60%.   2. There is moderate concentric increase in left ventricular wall thickness.   3. There is mild aortic stenosis.   4. There is a mobile echo noted associated with the posterior mitral valve leaflet consistent with a torn chordae.  There is mild prolapse of the posterior mitral valve leaflet.  There is moderate mitral insufficiency (anteriorly directed jet).  5. There is mild to moderate left atrial enlargement.      When compared to the prior real-time echocardiogram dated 31 May 2016, the findings are fairly similar on both studies.   Review of Systems  Performed and all negative except as mentioned above.    Past medical, family, and social history reviewed, unchanged from before.        Physical Exam:     General Appearance:  Alert, cooperative, no distress, appears stated age    Head:  Normocephalic, without obvious abnormality, atraumatic   Neck no stiffness or adenopathy  Eyes no conjunctivitis or icterus      Lungs:  Clear to auscultation bilaterally    Chest Wall:  No tenderness or deformity    Heart:  Regular rate and rhythm, S1, S2 normal, no rub or gallop    Abdomen:  Soft, non-tender, bowel sounds active , no masses, no organomegaly    Extremities:  Extremities normal, atraumatic, no cyanosis or edema,     Skin:  Skin color, texture, turgor normal, no rashes or lesions    Neurologic:  Alert and oriented X 3, Moves all 4 extremities        DATA     Results for orders placed or performed in visit on 06/16/17   Culture/Stain, Mycobacterium, Respiratory Specimen   Result Value Ref Range    Culture Growth of Acid-fast Organism (!)     AFB Stain No acid fast bacilli seen    Culture/Stain, Mycobacterium, Respiratory Specimen    Result Value Ref Range    Culture No Mycobacteria isolated     AFB Stain No acid fast bacilli seen    Culture/Stain, Mycobacterium, Respiratory Specimen   Result Value Ref Range    Culture Growth of Acid-fast Organism (!)     AFB Stain 10-20 AFB/Slide Acid fast bacilli    ID by [facility]   Result Value Ref Range    Scan Result See Scanned Report    ID by [facility]   Result Value Ref Range    Scan Result See Scanned Report          Kanika Dejesus MD

## 2021-06-16 PROBLEM — N32.81 OVERACTIVE BLADDER: Status: ACTIVE | Noted: 2019-01-01

## 2021-06-16 PROBLEM — E43 SEVERE PROTEIN-CALORIE MALNUTRITION (GOMEZ: LESS THAN 60% OF STANDARD WEIGHT) (H): Status: ACTIVE | Noted: 2020-01-01

## 2021-06-16 PROBLEM — R63.0 ANOREXIA: Status: ACTIVE | Noted: 2019-01-01

## 2021-06-16 PROBLEM — S61.419A SKIN TEAR OF HAND WITHOUT COMPLICATION: Status: ACTIVE | Noted: 2020-01-01

## 2021-06-16 PROBLEM — R41.89 COGNITIVE DECLINE: Status: ACTIVE | Noted: 2020-01-01

## 2021-06-16 PROBLEM — L89.151 PRESSURE INJURY OF COCCYGEAL REGION, STAGE 1: Status: ACTIVE | Noted: 2020-01-01

## 2021-06-16 PROBLEM — F02.80 ALZHEIMER'S DEMENTIA (H): Status: ACTIVE | Noted: 2020-01-01

## 2021-06-16 PROBLEM — T30.0 FRICTION BURN: Status: ACTIVE | Noted: 2020-01-01

## 2021-06-16 PROBLEM — S01.81XA LACERATION OF FACE: Status: ACTIVE | Noted: 2020-01-01

## 2021-06-16 PROBLEM — E53.8 VITAMIN B12 DEFICIENCY: Status: ACTIVE | Noted: 2019-01-01

## 2021-06-16 PROBLEM — I95.9 HYPOTENSION: Status: ACTIVE | Noted: 2020-01-01

## 2021-06-16 PROBLEM — M53.3 COCCYX PAIN: Status: ACTIVE | Noted: 2020-01-01
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
## 2021-06-16 PROBLEM — S56.219A: Status: ACTIVE | Noted: 2020-01-01

## 2021-06-16 PROBLEM — G30.9 ALZHEIMER'S DEMENTIA (H): Status: ACTIVE | Noted: 2020-01-01

## 2021-06-16 PROBLEM — A31.0 MAI (MYCOBACTERIUM AVIUM-INTRACELLULARE) (H): Status: ACTIVE | Noted: 2019-01-01

## 2021-06-16 PROBLEM — I95.1 ORTHOSTATIC HYPOTENSION: Status: ACTIVE | Noted: 2019-01-01

## 2021-06-16 PROBLEM — J47.9 BRONCHIECTASIS (H): Status: ACTIVE | Noted: 2019-01-01

## 2021-06-16 PROBLEM — M79.18 MUSCULOSKELETAL PAIN: Status: ACTIVE | Noted: 2019-01-01

## 2021-06-19 NOTE — LETTER
Letter by Estephania Espinosa NP at      Author: Estephania Espinosa NP Service: -- Author Type: --    Filed:  Encounter Date: 2019 Status: (Other)         Patient: Sonu Solomon   MR Number: [id number]   YOB: 1927   Date of Visit: 2019                 [facility]    DATE: 2019    NAME:  Sonu Solomon             :  1927  MRN: [id number]  CODE STATUS:  DNR/DNI    VISIT TYPE: Discharge Summary     FACILITY:  [facility] [[id number]]       CHIEF COMPLAIN/REASON FOR VISIT:    Chief Complaint   Patient presents with   ? Discharge Summary               HISTORY OF PRESENT ILLNESS: Sonu Solomon is a 89 y.o. male who was admitted to [facility] 7/3- for worsening shortness of breath. CT showed debris in left mainstem bronchus, bronchi to left lower lobe, new airspace disease in both upper lobes. He has history of chronic DAVID infection/nodular bronchiectasis. He was treated for pneumonia and exacerbation. He was weaned from oxygen by discharge and was treated with azithromycin and omnicef. He had some delirium during stay and it was suspected he had underlying dementia. His slums score in hospital was 19. He had preivously been struggling at home to remember to eat and had lost a significant amoutn of weight. He was driving but would frequently get lost. He was discharged to [facility]. He transferred to [facility] on 7/10. He has PMH of HTN, chronic DAVID infection, severe mitral valve prolapse, regurgitation, hyponatremia, aortic stenosis. Prior to this he lived at home and was still driving.     TCU course:   Mr. Solomon has made progress with therapy and is ambulating 400 feet with no assistive device. He scored 39/56 on gates so does have safety concerns. He scored 14/30 on slums so significant cognitive impairment and 20/30 on MOCA. He completed cefdinir on  and had resolution of cough, shortness of breath at the time. His  vitals were stable and no orthostasis in therapy noted. His cardiac appointment for follow up on mitral valve was cancelled and will be rescheduled after discharges from TCU. He had issues with overactive bladder symptoms and Urine culture was negative. He was started on oxybutynin and dose increased to 10mg and now is much improved. His b12 level was stable as were labs. He did have some chest wall musculoskeletal pain that was resolved with therapy, ice, tylenol. His weights had dropped at home previously due to forgetting to eat but now he is back up to 109lbs. He will not be returning to his [facility] but will be moving to [facility]. He was recommended to stop driving. He will f/u with PCP or in house provider in one week from discharge. He will be moving over there on 7/23.       REVIEW OF SYSTEMS:  PROBLEMS AND REVIEW OF SYSTEMS:   Today on ROS:   Currently, no fever, chills, or rigors. Decreased vision, hearing intact. Denies any chest pain, headaches, palpitations, lightheadedness, dizziness, shortness of breath. Appetite is good. Denies any GERD symptoms. Denies any difficulty with swallowing, nausea, or vomiting.  Denies any abdominal pain, diarrhea or constipation. No insomnia. No active bleeding. No rash. Positive for forgetfulness, urinary frequency, nocturia, urgency, incontinence-improving    Allergies   Allergen Reactions   ? Hydrocodone-Acetaminophen Nausea Only   ? Tree And Shrub Pollen Other (See Comments)     Sneezing from evergreen tree pollen in winter.     Current Outpatient Medications   Medication Sig   ? acetaminophen (TYLENOL) 500 MG tablet Take 1,000 mg by mouth 3 (three) times a day as needed for pain.   ? aspirin 81 mg chewable tablet Chew 81 mg daily.   ? carvedilol (COREG) 6.25 MG tablet Take 1 tablet (6.25 mg total) by mouth 2 (two) times a day with meals.   ? cyanocobalamin (VITAMIN B-12) 500 MCG tablet Take 500 mcg by mouth daily.   ? lisinopril  (PRINIVIL,ZESTRIL) 2.5 MG tablet Take 1 tablet (2.5 mg total) by mouth daily.   ? multivitamin with minerals (THERA-M) 9 mg iron-400 mcg Tab tablet Take 1 tablet by mouth daily.   ? oxybutynin (DITROPAN XL) 10 MG ER tablet Take 10 mg by mouth daily.     Past Medical History:    Past Medical History:   Diagnosis Date   ? Arrhythmia     PAC's   ? Hypertension    ? Valvular disease     MVP with mod-severe MR           PHYSICAL EXAMINATION  Vitals:    07/21/19 1839   BP: 103/62   Pulse: 70   Resp: 18   Temp: 98.3  F (36.8  C)   SpO2: 93%   Weight: 109 lb (49.4 kg)       Today on physical exam:     GENERAL: Awake, Alert, oriented x3, not in any form of acute distress, answers questions appropriately, follows simple commands, conversant, small frame  HEENT: Head is normocephalic with normal hair distribution. No evidence of trauma. Ears: No acute purulent discharge. Eyes: Conjunctivae pink with no scleral jaundice. Nose: Normal mucosa and septum. NECK: Supple with no cervical or supraclavicular lymphadenopathy. Trachea is midline.   CHEST: No tenderness or deformity, no crepitus  LUNG: dim to auscultation with good chest expansion. There are no crackles or wheezes, normal AP diameter.  BACK: No kyphosis of the thoracic spine. Symmetric, no curvature, ROM normal, no CVA tenderness, no spinal tenderness   CVS: There is good S1  S2, regular rhythm, murmur present,  2+ pulses symmetric in all extremities.  ABDOMEN: concave and soft, nontender to palpation, non distended, no masses, no organomegaly, good bowel sounds, no rebound or guarding, no peritoneal signs.   EXTREMITIES: No pedal edema, Atraumatic. Full range of motion on both upper and lower extremities, there is no tenderness to palpation, no cyanosis or clubbing, no calf tenderness.  Pulses equal in all extremities, normal cap refill, no joint swelling.  SKIN: Warm and dry, no erythema noted.  Skin color, texture, no rashes or lesions.  NEUROLOGICAL: The patient is  oriented to person, place and time, forgetful at times.           LABS:   Recent Results (from the past 168 hour(s))   Basic Metabolic Panel   Result Value Ref Range    Sodium 136 136 - 145 mmol/L    Potassium 4.2 3.5 - 5.0 mmol/L    Chloride 103 98 - 107 mmol/L    CO2 28 22 - 31 mmol/L    Anion Gap, Calculation 5 5 - 18 mmol/L    Glucose 75 70 - 125 mg/dL    Calcium 8.8 8.5 - 10.5 mg/dL    BUN 17 8 - 28 mg/dL    Creatinine 0.68 (L) 0.70 - 1.30 mg/dL    GFR MDRD Af Amer >60 >60 mL/min/1.73m2    GFR MDRD Non Af Amer >60 >60 mL/min/1.73m2   HM1 (CBC with Diff)   Result Value Ref Range    WBC 8.2 4.0 - 11.0 thou/uL    RBC 3.73 (L) 4.40 - 6.20 mill/uL    Hemoglobin 10.7 (L) 14.0 - 18.0 g/dL    Hematocrit 33.9 (L) 40.0 - 54.0 %    MCV 91 80 - 100 fL    MCH 28.7 27.0 - 34.0 pg    MCHC 31.6 (L) 32.0 - 36.0 g/dL    RDW 15.6 (H) 11.0 - 14.5 %    Platelets 239 140 - 440 thou/uL    MPV 9.5 8.5 - 12.5 fL    Neutrophils % 73 (H) 50 - 70 %    Lymphocytes % 15 (L) 20 - 40 %    Monocytes % 8 2 - 10 %    Eosinophils % 4 0 - 6 %    Basophils % 0 0 - 2 %    Neutrophils Absolute 5.9 2.0 - 7.7 thou/uL    Lymphocytes Absolute 1.2 0.8 - 4.4 thou/uL    Monocytes Absolute 0.6 0.0 - 0.9 thou/uL    Eosinophils Absolute 0.3 0.0 - 0.4 thou/uL    Basophils Absolute 0.0 0.0 - 0.2 thou/uL   Vitamin B12   Result Value Ref Range    Vitamin B-12 657 213 - 816 pg/mL     Results for orders placed or performed in visit on 07/19/19   Basic Metabolic Panel   Result Value Ref Range    Sodium 136 136 - 145 mmol/L    Potassium 4.2 3.5 - 5.0 mmol/L    Chloride 103 98 - 107 mmol/L    CO2 28 22 - 31 mmol/L    Anion Gap, Calculation 5 5 - 18 mmol/L    Glucose 75 70 - 125 mg/dL    Calcium 8.8 8.5 - 10.5 mg/dL    BUN 17 8 - 28 mg/dL    Creatinine 0.68 (L) 0.70 - 1.30 mg/dL    GFR MDRD Af Amer >60 >60 mL/min/1.73m2    GFR MDRD Non Af Amer >60 >60 mL/min/1.73m2         Lab Results   Component Value Date    WBC 8.2 07/19/2019    HGB 10.7 (L) 07/19/2019    HCT 33.9  (L) 07/19/2019    MCV 91 07/19/2019     07/19/2019       Lab Results   Component Value Date    VLMHHNNS21 657 07/19/2019     No results found for: HGBA1C  Lab Results   Component Value Date    INR 1.05 03/13/2018     No results found for: SQOALTMT07OH  Lab Results   Component Value Date    TSH 2.04 05/28/2015           ASSESSMENT/PLAN:    1. Community acquired pneumonia, Acute on chronic bronchiectasis, chronic DAVID infection: completed cefdinir 7/12. resolved. F/u with ID prn only.  Mild cough today did order robitussin prn. Lungs clear.   2. HTN: SBP 100s. On coreg, lisinopril. hold parameters. No orthostasis in therapy thus far. Has been well controlled.   3. Aortic stenosis, severe mitral valve prolapse, regurgitation: On aspirin, coreg, lisinopril. Cardiac appt postponed.  F/u after discharge.   4. Overactive bladder: Reports urinary urgency, frequency, nocturia, incontinence at times. Resolved since increasing oxybutynin xr to 10mg.   5. Vitamin b 12 deficiency: On vitamin b12. Last b12 level on chart from February 545. Check level on 7/.  6. Anorexia: recent weight loss, forgetting to eat. Down to 102lbs, [phone] . Improved in facility setting.   7. Left chest wall strain, musculoskeletal pain:tylenol prn. Resolved.      Electronically signed by: Estephania Espinosa NP     Total floor/unit time spent 35 min with 25 min spent on counseling and coordination of care. Counseling regarding tcu course, med changes, specialty follow ups, primary care follow ups, bronchiectasis management, mitral prolapse management, overactive bladder management. Coordinated care with nursing, therapy,  for discharge planning, services for discharge, change in setting, primary care and specialty care follow ups.       Please evaluate Sonu Solomon for admission to Home Health.    Face to Face Attestation and Initial Plan of Care    The face-to-face encounter occurred on date: 7/22/19  Face to Face  "encounter was with: Estephania Espinosa    Please provide brief clinical summary of reason for visit and need for home care. Deconditioning after hospital stay for cap, bronchiectasis    Please identify which of the following home health disciplines the patient will need AND describe the skilled services that you would like the home health agency to perform: SKILLED NURSING (RN): complex med management, PHYSICAL THERAPY: strength training and gait training, OCCUPATIONAL THERAPY: ADLs and home safety and SPEECH LANGUAGE PATHOLOGY:other    Homebound Status (describe the functional limitations that support this patient is confined to his/her home. Medicaid recipients are not required to be homebound.):patient is homebound due to cognitive deficits    Name of physician who will be responsible for the ongoing home health plan of care (CMS requires the referring physician to provide the specific name of the community physician instead of a title, such as \"PCP\"): Arturo Ludwig MD    Requested Start of Care Date: Within 48 hours    Other information to assist the home health agency in developing the initial Plan of Care:    I certify that services are/were furnished while this patient was under the care of a physician and that a physician or an allowed non-physician practitioner (NPP), had a face-to-face encounter that meets the physician face-to-face encounter requirements. The encounter was in whole, or in part, related to the primary reason for home health. The patient is confined to his/her home and needs intermittent skilled nursing, physical therapy, speech-language pathology, or the continued need for occupational therapy. A plan of care has been established by a physician and is periodically reviewed by a physician.             "

## 2021-06-19 NOTE — LETTER
Letter by Estephania Espinosa NP at      Author: Estephania Espinosa NP Service: -- Author Type: --    Filed:  Encounter Date: 2019 Status: (Other)         Patient: Sonu Solomon   MR Number: [id number]   YOB: 1927   Date of Visit: 2019                 [facility]    DATE: 2019    NAME:  Sonu Solomon             :  1927  MRN: [id number]  CODE STATUS:  DNR/DNI    VISIT TYPE: Problem Visit (hospital f/u)     FACILITY:  [facility] [[id number]]       CHIEF COMPLAIN/REASON FOR VISIT:    Chief Complaint   Patient presents with   ? Problem Visit     hospital f/               HISTORY OF PRESENT ILLNESS: Sonu Solomon is a 89 y.o. male who was admitted to [facility] 7/3- for worsening shortness of breath. CT showed debris in left mainstem bronchus, bronchi to left lower lobe, new airspace disease in both upper lobes. He has history of chronic DAVID infection/nodular bronchiectasis. He was treated for pneumonia and exacerbation. He was weaned from oxygen by discharge and was treated with azithromycin and omnicef. He had some delirium during stay and it was suspected he had underlying dementia. His slums score in hospital was 19. He had preivously been struggling at home to remember to eat and had lost a significant amoutn of weight. He was driving but would frequently get lost. He was discharged to [facility]. [facility]. He has PMH of HTN, chronic DAVID infection, severe mitral valve prolapse, regurgitation, hyponatremia, aortic stenosis. Prior to this he lived at home and was still driving.     Today Mr. Solomon is seen in his room with daughter present. She is an LPN and her sister is an RN. Sonu says his main concern is his urinary frequency at night. He used to get up about 1-2 times per night at home to go to the bathroom but now he is getting up 3-4 times per night. He had been taking medicine at home  for this. His daughter reports and shows provider box from home that was a natural supplement for prostate health. He says he took this every day and it helped at first but then it didn't. His daughter reports he had seen a urologist a few years ago and had an  prescription for oxybutynin at home. This change started about Monday and at the other TCU they did check a UA which was negative for infection and his labs looked good. They even did a bladder scan and said he was not retaining anything. The daughter is wondering if he should be on flomax or if the oxybutynin should be scheduled. Right now it is ordered as needed and she was told by the nurses at [facility] that there could be issues with his blood pressure giving it at night. She had not heard this before. She also noticed around the time he was complaining of the urinary symptoms he started having more balance issues. So she was very surprised that he did not have a UTI. She said even the therapists at [facility] commented on how different he was. Other than this he is sleeping well and has no pain. He denies shortness of breath but does cough sometimes. He is coughing into a cup on occasion some clear phlegm. His bowels are moving fine and appetite is great. He has no stomach upset or nausea. He had recently lost weight at home due to forgetting to eat. His weight at home was around 102lbs but they think he has gained now. They are hoping to monitor this closely while he is here. He is not having any dizziness and today in therapy they even checked his orthostatic vitals and they were good. His systolic blood pressure remained 100-110s even with standing. He does have a runny nose and has allergy symptoms. His daughter says she had him on zyrtec at home but wants to wait until his pneumonia is clear before this is restarted. He is not having any chest pain, pressure, palpitations. He has no swelling in his legs. He wears glasses but no hearing  aids. He has had issues with his memory for the past year and half, but especially last few months. He is receptive to doing speech therapy or cognitive therapy. He did not use a cane or walker at home before and was still driving. His daughter is concerned that he should not be living alone anymore. He says he knew this day was coming and had sold his house 4 years ago and downsized to a townhouse. His daughter reports he was preparing all of them for this some day and each child has their own role in his care. Her sister Samantha is his POA and is taking care of his finances and they will work on selling his home. She is also POA for healthcare as she is a nurse. They have him on a waiting list for [facility] because he attends [employer] [employer] [facility] [employer] and they are connected to them. They think he will probably know some people there. His daughter is asking whether he should attend a cardiology appointment next week that is just routine checking on his mitral valve or if this could be postponed until later. He also has an infectious disease appointment they are going to cancel because they were told he only needed to follow up if having more problems. She is wondering provider's opinion on these. Discussed since they are routing and not having current issues can wait until after TCU stay. When therapy evaluated today they gave ok for no assistive device, just said would work on balance and strengthening. She is wondering how long he will be here. His daughter states he is very intelligent  who worked for [employer] and has many patents and contracts. He even worked with [email or web address].     REVIEW OF SYSTEMS:  PROBLEMS AND REVIEW OF SYSTEMS:   Today on ROS:   Currently, no fever, chills, or rigors. Decreased vision, hearing intact. Denies any chest pain, headaches, palpitations, lightheadedness, dizziness, shortness of breath. Appetite is good. Denies any GERD symptoms. Denies any difficulty with swallowing,  nausea, or vomiting.  Denies any abdominal pain, diarrhea or constipation. No insomnia. No active bleeding. No rash. Positive for forgetfulness, weight loss, urinary frequency, nocturia, urgency, incontinence, runny nose, congested cough at times      Allergies   Allergen Reactions   ? Hydrocodone-Acetaminophen Nausea Only   ? Tree And Shrub Pollen Other (See Comments)     Sneezing from evergreen tree pollen in winter.     Current Outpatient Medications   Medication Sig   ? aspirin 81 mg chewable tablet Chew 81 mg daily.   ? carvedilol (COREG) 6.25 MG tablet Take 1 tablet (6.25 mg total) by mouth 2 (two) times a day with meals.   ? cefdinir (OMNICEF) 300 MG capsule Take 1 capsule (300 mg total) by mouth 2 times a day at 6:00 am and 4:00 pm for 6 days.   ? cyanocobalamin (VITAMIN B-12) 500 MCG tablet Take 500 mcg by mouth daily.   ? lisinopril (PRINIVIL,ZESTRIL) 2.5 MG tablet Take 1 tablet (2.5 mg total) by mouth daily.   ? multivitamin with minerals (THERA-M) 9 mg iron-400 mcg Tab tablet Take 1 tablet by mouth daily.   ? oxybutynin (DITROPAN) 5 MG tablet Take 5 mg by mouth at bedtime.            Past Medical History:    Past Medical History:   Diagnosis Date   ? Arrhythmia     PAC's   ? Hypertension    ? Valvular disease     MVP with mod-severe MR           PHYSICAL EXAMINATION  Vitals:    07/11/19 0700   BP: 130/79   Pulse: 80   Resp: 18   Temp: 97.2  F (36.2  C)   SpO2: 90%       Today on physical exam:     GENERAL: Awake, Alert, oriented x3, not in any form of acute distress, answers questions appropriately, follows simple commands, conversant, small frame  HEENT: Head is normocephalic with normal hair distribution. No evidence of trauma. Ears: No acute purulent discharge. Eyes: Conjunctivae pink with no scleral jaundice. Nose: Normal mucosa and septum. NECK: Supple with no cervical or supraclavicular lymphadenopathy. Trachea is midline. Clear rhinorrhea  CHEST: No tenderness or deformity, no crepitus  LUNG: dim  to auscultation with good chest expansion. There are no crackles or wheezes, normal AP diameter.  BACK: No kyphosis of the thoracic spine. Symmetric, no curvature, ROM normal, no CVA tenderness, no spinal tenderness   CVS: There is good S1  S2, regular rhythm, murmur present,  2+ pulses symmetric in all extremities.  ABDOMEN: concave and soft, nontender to palpation, non distended, no masses, no organomegaly, good bowel sounds, no rebound or guarding, no peritoneal signs.   EXTREMITIES: No pedal edema, Atraumatic. Full range of motion on both upper and lower extremities, there is no tenderness to palpation, no cyanosis or clubbing, no calf tenderness.  Pulses equal in all extremities, normal cap refill, no joint swelling.  SKIN: Warm and dry, no erythema noted.  Skin color, texture, no rashes or lesions.  NEUROLOGICAL: The patient is oriented to person, place and time, forgetful at times.           LABS:   Recent Results (from the past 168 hour(s))   Culture/Stain, Mycobacterium, Respiratory   Result Value Ref Range    AFB Stain No acid fast bacilli seen    ECG 12 lead MUSE   Result Value Ref Range    SYSTOLIC BLOOD PRESSURE  mmHg    DIASTOLIC BLOOD PRESSURE  mmHg    VENTRICULAR RATE 69 BPM    ATRIAL RATE 69 BPM    P-R INTERVAL 158 ms    QRS DURATION 98 ms    Q-T INTERVAL 440 ms    QTC CALCULATION (BEZET) 471 ms    P Axis 79 degrees    R AXIS 86 degrees    T AXIS 45 degrees    MUSE DIAGNOSIS       ** Poor data quality, interpretation may be adversely affected  Sinus rhythm with marked sinus arrhythmia  Minimal criteria for Nonspecific ST and T wave abnormality  Abnormal ECG  When compared with ECG of 03-JUL-2019 17:15,  Poor data quality in current ECG precludes serial comparison    Confirmed by JUANCARLOS LOZADA MD LOC:[facility] ([id number]) on 7/5/2019 2:29:33 PM     Urinalysis   Result Value Ref Range    Color, UA Yellow Colorless, Yellow, Straw, Light Yellow    Clarity, UA Clear Clear    Glucose, UA Negative Negative     Bilirubin, UA Negative Negative    Ketones, UA Negative Negative    Specific Gravity, UA 1.018 1.001 - 1.030    Blood, UA Negative Negative    pH, UA 6.5 4.5 - 8.0    Protein, UA Negative Negative mg/dL    Urobilinogen, UA <2.0 E.U./dL <2.0 E.U./dL, 2.0 E.U./dL    Nitrite, UA Negative Negative    Leukocytes, UA Negative Negative   Culture, Urine   Result Value Ref Range    Culture No Growth    White Blood Count (WBC)   Result Value Ref Range    WBC 6.2 4.0 - 11.0 thou/uL     Results for orders placed or performed during the hospital encounter of 07/03/19   Basic Metabolic Panel   Result Value Ref Range    Sodium 143 136 - 145 mmol/L    Potassium 4.0 3.5 - 5.0 mmol/L    Chloride 108 (H) 98 - 107 mmol/L    CO2 30 22 - 31 mmol/L    Anion Gap, Calculation 5 5 - 18 mmol/L    Glucose 85 70 - 125 mg/dL    Calcium 8.6 8.5 - 10.5 mg/dL    BUN 24 8 - 28 mg/dL    Creatinine 0.72 0.70 - 1.30 mg/dL    GFR MDRD Af Amer >60 >60 mL/min/1.73m2    GFR MDRD Non Af Amer >60 >60 mL/min/1.73m2         Lab Results   Component Value Date    WBC 6.2 07/09/2019    HGB 11.6 (L) 07/04/2019    HCT 37.2 (L) 07/04/2019    MCV 93 07/04/2019     07/04/2019       Lab Results   Component Value Date    TRSSNTYP07 546 02/15/2019     No results found for: HGBA1C  Lab Results   Component Value Date    INR 1.05 03/13/2018     No results found for: [id number]  Lab Results   Component Value Date    TSH 2.04 05/28/2015           ASSESSMENT/PLAN:    1. Community acquired pneumonia, Acute on chronic bronchiectasis, chronic DAVID infection: On cefdinir until 7/12. Congested cough at times-clear phlegm only. No shortness of breath. No hypoxia, fevers, chills. Much improved. F/u 7/17 with ID - daughter cancelling as doing well.   2. HTN: SBP 130s. On coreg, lisinopril. Ordered hold parameters. No orthostasis in therapy today.   3. Aortic stenosis, severe mitral valve prolapse, regurgitation: On aspirin, coreg, lisinopril. F/u scheduled for 7/16 will  postpone until after discharge.   4. Overactive bladder: Reports urinary urgency, frequency, nocturia, incontinence at times. Previously took oxybutynin per urology. Now ordered prn. Will change to at bedtime and monitor symptoms. Urine culture no growth on 7/9. Discussed flomax and since not retaining and not having symptoms relating to hesitancy, difficulty starting, not complete emptying feel more bladder wall irritation and OAB instead of just BPH symptoms.   5. Vitamin b 12 deficiency: On vitamin b12. Last b12 level on chart from February 545. Will consider rechecking with next lab draw.   6. Anorexia: recent weight loss, forgetting to eat. Down to 102lbs, no weight yet this admission. Will monitor 3 times weekly.     Labs recently done 7/4 and 7/9 stable    Therapy eval today    Electronically signed by: Estephania Espinosa NP    Total floor/unit time spent 35 with 25 time spent on counseling and coordination of care. Counseling was done regarding hospital course, previous tcu course, urinary symptoms, causes, treatment options. Counseling regarding weight monitoring, lab monitoring, specialty care follow up, potential discharge plan and options for increased level of care. Coordinated care with nursing for clarification of admission orders, management of overactive bladder, urinary incontinence, lab monitoring, skilled therapy needs.

## 2021-06-19 NOTE — LETTER
Letter by Estephania Espinosa NP at      Author: Estephania Espinosa NP Service: -- Author Type: --    Filed:  Encounter Date: 2019 Status: (Other)         Patient: Sonu Solomon   MR Number: [id number]   YOB: 1927   Date of Visit: 2019                 [facility]    DATE: 2019    NAME:  Sonu Solomon             :  1927  MRN: [id number]  CODE STATUS:  DNR/DNI    VISIT TYPE: Problem Visit (dizziness, hypotension)     FACILITY:  [facility] [[id number]]       CHIEF COMPLAIN/REASON FOR VISIT:    Chief Complaint   Patient presents with   ? Problem Visit     dizziness, hypotension               HISTORY OF PRESENT ILLNESS: Sonu Solomon is a 89 y.o. male who is a resident of [facility] and will be followed by in house provider service. He has PMH of HTN, chronic DAVID infection, severe mitral valve prolapse, regurgitation, hyponatremia, aortic stenosis.     Today Mr. Solomon is seen for concerns of dizziness and lightheadedness episode with therapy. His blood pressure was low 80/58 standing and sitting was 91/66. His pulse was in 90s. He did not pass out but therapy and family felt he was a little confused. UA was ordered and held lisinopril. He was encouraged to push fluids and recheck blood pressure one week. On exam today he is seen with daughter Aparna loera. He does not recall having an episode with therapy but does admit he feels dizzy at times. He says he is trying to drink fluids and his daughter showed a schedule they had written out for him to follow each day and it has listed on there when to fill up his water cup. She says he has been trying to follow this. She says he has always tended to try not to drink a lot of fluids so he didn't have to urinate as often. His blood pressure this morning is 90/61. We discussed he may still be dehydrated and blood pressure medicine has been held for two days. We will go ahead and  discontinue the lisinopril altogether. Will still have nursing recheck blood pressure next week. If he remains low we discussed reducing his coreg dose. His weight was also noted to be down again today to 102lbs and he has been using the same scale. He was up to 110lbs before. He drinks one boost a day after breakfast and we discussed adding a second one after dinner. His daughter will add this to his routine schedule. He denies any feelings of passing out or dizziness today. He is not having any urinary trouble or shortness of breath. We discussed his UA was negative for UTI. If his blood pressure remains low next week we will also consider checking labs. No other concerns per him and daughter today.     REVIEW OF SYSTEMS:  PROBLEMS AND REVIEW OF SYSTEMS:   Today on ROS:   Currently, no fever, chills, or rigors. Decreased vision, hearing intact. Denies any chest pain, headaches, palpitations, lightheadedness, dizziness, shortness of breath. Appetite is good. Denies any GERD symptoms. Denies any difficulty with swallowing, nausea, or vomiting.  Denies any abdominal pain, diarrhea or constipation. No insomnia. No active bleeding. No rash. Positive for forgetfulness, urinary frequency, nocturia, urgency, incontinence at times, weight loss, hypotension    Allergies   Allergen Reactions   ? Hydrocodone-Acetaminophen Nausea Only   ? Tree And Shrub Pollen Other (See Comments)     Sneezing from evergreen tree pollen in winter.     Current Outpatient Medications   Medication Sig   ? acetaminophen (TYLENOL) 500 MG tablet Take 1,000 mg by mouth 3 (three) times a day as needed for pain.   ? carvedilol (COREG) 6.25 MG tablet Take 1 tablet (6.25 mg total) by mouth 2 (two) times a day with meals.   ? cyanocobalamin (VITAMIN B-12) 500 MCG tablet Take 500 mcg by mouth daily.   ? oxybutynin (DITROPAN XL) 10 MG ER tablet Take 10 mg by mouth daily.   ? sodium chloride (OCEAN) 0.65 % nasal spray Apply 1 spray into each nostril as  needed for congestion.     Past Medical History:    Past Medical History:   Diagnosis Date   ? Arrhythmia     PAC's   ? Hypertension    ? Valvular disease     MVP with mod-severe MR           PHYSICAL EXAMINATION  Vitals:    08/14/19 0700   BP: 90/61   Pulse: 81   Resp: 18   Temp: 97.4  F (36.3  C)   SpO2: 94%   Weight: 102 lb (46.3 kg)       Today on physical exam:     GENERAL: Awake, Alert, oriented x3, not in any form of acute distress, answers questions appropriately, follows simple commands, conversant, small frame  HEENT: Head is normocephalic with normal hair distribution. No evidence of trauma. Ears: No acute purulent discharge. Eyes: Conjunctivae pink with no scleral jaundice. Nose: Normal mucosa and septum. NECK: Supple with no cervical or supraclavicular lymphadenopathy. Trachea is midline. [place]  CHEST: No tenderness or deformity, no crepitus  LUNG: dim to auscultation with good chest expansion. There are no crackles or wheezes, normal AP diameter.  BACK: No kyphosis of the thoracic spine. Symmetric, no curvature, ROM normal, no CVA tenderness, no spinal tenderness   CVS: There is good S1  S2, regular rhythm, murmur present,  2+ pulses symmetric in all extremities.  ABDOMEN: concave and soft, nontender to palpation, non distended, no masses, no organomegaly, good bowel sounds, no rebound or guarding, no peritoneal signs.   EXTREMITIES: No pedal edema, Atraumatic. Full range of motion on both upper and lower extremities, there is no tenderness to palpation, no cyanosis or clubbing, no calf tenderness.  Pulses equal in all extremities, normal cap refill, no joint swelling.  SKIN: Warm and dry, no erythema noted.  Skin color, texture, no rashes or lesions.  NEUROLOGICAL: The patient is oriented to person, place and time, forgetful at times.           LABS:   Recent Results (from the past 168 hour(s))   Urinalysis-UC if Indicated   Result Value Ref Range    Color, UA Yellow Colorless, Yellow, Straw, Light  Yellow    Clarity, UA Clear Clear    Glucose, UA Negative Negative    Bilirubin, UA Negative Negative    Ketones, UA Negative Negative    Specific Gravity, UA 1.012 1.001 - 1.030    Blood, UA Negative Negative    pH, UA 7.0 4.5 - 8.0    Protein, UA Negative Negative mg/dL    Urobilinogen, UA <2.0 E.U./dL <2.0 E.U./dL, 2.0 E.U./dL    Nitrite, UA Negative Negative    Leukocytes, UA Negative Negative   Culture, Urine   Result Value Ref Range    Culture No Growth      Results for orders placed or performed in visit on 07/19/19   Basic Metabolic Panel   Result Value Ref Range    Sodium 136 136 - 145 mmol/L    Potassium 4.2 3.5 - 5.0 mmol/L    Chloride 103 98 - 107 mmol/L    CO2 28 22 - 31 mmol/L    Anion Gap, Calculation 5 5 - 18 mmol/L    Glucose 75 70 - 125 mg/dL    Calcium 8.8 8.5 - 10.5 mg/dL    BUN 17 8 - 28 mg/dL    Creatinine 0.68 (L) 0.70 - 1.30 mg/dL    GFR MDRD Af Amer >60 >60 mL/min/1.73m2    GFR MDRD Non Af Amer >60 >60 mL/min/1.73m2         Lab Results   Component Value Date    WBC 8.2 07/19/2019    HGB 10.7 (L) 07/19/2019    HCT 33.9 (L) 07/19/2019    MCV 91 07/19/2019     07/19/2019       Lab Results   Component Value Date    [id number] 657 07/19/2019     No results found for: HGBA1C  Lab Results   Component Value Date    INR 1.05 03/13/2018     No results found for: [id number]  Lab Results   Component Value Date    TSH 2.04 05/28/2015           ASSESSMENT/PLAN:    1. Chronic bronchiectasis, chronic DAVID: recent pneumonia. F/u with ID prn only if want to treat chronically with prophylactic antibiotics.  Lungs clear, o2 sats stable. Family requesting weekly vitals to monitor closely, avoiding public places, avoiding ill contacts. Hand  in apartment. Avoid sick contacts when possible. No ID follow up at this time, given age need to weigh risk v benefit of long term high dose antibiotic treatment. Monitor for now.   2. HTN: SBP 80-90s. On coreg, lisinopril. Dc lisinopril due to hypotension.  Will consider reducing coreg dose if low next week and checking labs. Dizziness with therapy on 8/12. Encouraging fluids.   3. Aortic stenosis, severe mitral valve prolapse, regurgitation: On coreg, lisinopril. No cardiology follow up at this time. Stable, progressing over time and was just monitoring. Non surgical candidate. DC lisinopril due to hypotension.    4. Overactive bladder: On oxybutynin and now stable. Only 1-2 episodes of nocturia per night. Family requested PSA will check with next hg.   5. Vitamin b 12 deficiency: On vitamin b12. Last b12 level on chart from February 545. Check level on 7/.  6. Anorexia: resolved now. [phone]. Drinking one boost per day, increase fluid intake and increase boost to twice daily. Continue to monitor closely.   7. Anemia of chronic disease: Hg  11.6 on 7/4-10.7 on 7/19. Recheck hg in 4 weeks.   8. Allergic rhinitis: saline nasal spray prn.     Electronically signed by: Estephania Espinosa NP     Total floor/unit time spent 40 min with 25 min spent on counseling and coordination of care. Counseling regarding causes for hypotension and orthostatic hypotension. Counseling regarding anorexia and treatment options. Counseling regarding need for med changes and discussion of further med adjustments and lab monitoring if remains hypotensive. Counseling regarding lab results. Coordinated care with nursing for management of hypotension, anorexia, orthostatic hypotension/presyncope, lab monitoring.

## 2021-06-19 NOTE — LETTER
Letter by Henrry Vargas MD at      Author: Henrry Vargas MD Service: -- Author Type: --    Filed:  Encounter Date: 7/16/2019 Status: (Other)         Patient: Sonu Solomon   MR Number: [id number]   YOB: 1927   Date of Visit: 7/16/2019     [facility]      Facility:    [facility] [[id number]]  Code Status: DNR      Chief Complaint/Reason for Visit:  Chief Complaint   Patient presents with   ? H & P       HPI:   Sonu is a 89 y.o. male who was hospitalized most recently for pneumonia.  He does have a chronic MAC infection/bronchiectasis.  He does also have severe mitral valve prolapse with regurgitation.  He also has hypertension.  While he was in the hospital he had delirium.  His slums score was 19.  He was most recently transferred from [facility].    Upon current review of systems he still has significant urinary frequency but the frequency is gone from every hour down to every 2-3 hours with the extended release oxybutynin.  He is not having fevers or chills.  He does not have sore throat or nasal congestion.  He does not have cough or shortness of breath currently compared to what it was before but he does have some chronic cough related to the bronchiectasis.  He does not have chest pain or palpitations of the heart.  He does not have abdominal pain or nausea or diarrhea.    Past Medical History:  Past Medical History:   Diagnosis Date   ? Arrhythmia     PAC's   ? Hypertension    ? Valvular disease     MVP with mod-severe MR           Surgical History:  Past Surgical History:   Procedure Laterality Date   ? APPENDECTOMY Right    ? INCISIONAL HERNIA REPAIR Left 05/23/2008    Surgeon Dr. Brandon at [facility].       Family History:     Family history is not pertinent to chief complaint or presenting problems.    Social History:    Social History     Socioeconomic History   ? Marital status:      Spouse name: Not on  file   ? Number of children: Not on file   ? Years of education: Not on file   ? Highest education level: Not on file   Occupational History   ? Not on file   Social Needs   ? Financial resource strain: Not on file   ? Food insecurity:     Worry: Not on file     Inability: Not on file   ? Transportation needs:     Medical: Not on file     Non-medical: Not on file   Tobacco Use   ? Smoking status: Former Smoker     Types: Pipe   ? Smokeless tobacco: Never Used   Substance and Sexual Activity   ? Alcohol use: Not on file   ? Drug use: No   ? Sexual activity: Not on file   Lifestyle   ? Physical activity:     Days per week: Not on file     Minutes per session: Not on file   ? Stress: Not on file   Relationships   ? Social connections:     Talks on phone: Not on file     Gets together: Not on file     Attends Gnosticism service: Not on file     Active member of club or organization: Not on file     Attends meetings of clubs or organizations: Not on file     Relationship status: Not on file   ? Intimate partner violence:     Fear of current or ex partner: Not on file     Emotionally abused: Not on file     Physically abused: Not on file     Forced sexual activity: Not on file   Other Topics Concern   ? Not on file   Social History Narrative   ? Not on file          Review of Systems   All other systems reviewed and are negative.      Vitals:    07/16/19 1416   BP: 119/63   Pulse: 76   Temp: 97.2  F (36.2  C)   SpO2: 94%       Physical Exam   Constitutional: No distress.   HENT:   Mouth/Throat: Oropharynx is clear and moist.   Eyes: Right eye exhibits no discharge. Left eye exhibits no discharge.   Neck: No JVD present. No thyromegaly present.   Cardiovascular: Normal heart sounds.   Pulmonary/Chest: Breath sounds normal. No respiratory distress.   Abdominal: Soft. Bowel sounds are normal. He exhibits no distension. There is no tenderness.   Musculoskeletal: He exhibits no edema or tenderness.   Lymphadenopathy:     He has  no cervical adenopathy.   Neurological: He is alert.   Skin: Skin is warm and dry.   Psychiatric: He has a normal mood and affect.   Nursing note and vitals reviewed.      Medication List:  Current Outpatient Medications   Medication Sig   ? aspirin 81 mg chewable tablet Chew 81 mg daily.   ? carvedilol (COREG) 6.25 MG tablet Take 1 tablet (6.25 mg total) by mouth 2 (two) times a day with meals.   ? cyanocobalamin (VITAMIN B-12) 500 MCG tablet Take 500 mcg by mouth daily.   ? lisinopril (PRINIVIL,ZESTRIL) 2.5 MG tablet Take 1 tablet (2.5 mg total) by mouth daily.   ? multivitamin with minerals (THERA-M) 9 mg iron-400 mcg Tab tablet Take 1 tablet by mouth daily.   ? oxybutynin (DITROPAN XL) 10 MG ER tablet Take 10 mg by mouth daily.       Labs:  No new laboratory testing.    Assessment:    ICD-10-CM    1. Pneumonia of both upper lobes due to infectious organism (H) J18.1    2. Bronchiectasis with acute exacerbation (H) J47.1    3. Overactive bladder N32.81    4. MVP (mitral valve prolapse) I34.1        Plan:  Continue with evaluation and treatment by physical and occupational therapies regarding strength, gait, and independence of activities of daily living.  Continue to monitor medical conditions.      Electronically signed by: Henrry Vargas MD

## 2021-06-19 NOTE — LETTER
Letter by Estephania Espinosa NP at      Author: Estephania Espinosa NP Service: -- Author Type: --    Filed:  Encounter Date: 2019 Status: (Other)         Patient: Snou Solomon   MR Number: [id number]   YOB: 1927   Date of Visit: 2019                 [facility]    DATE: 2019    NAME:  Sonu Solomon             :  1927  MRN: [id number]  CODE STATUS:  DNR/DNI    VISIT TYPE: H & P     FACILITY:  [facility] [[id number]]       CHIEF COMPLAIN/REASON FOR VISIT:    Chief Complaint   Patient presents with   ? H & P               HISTORY OF PRESENT ILLNESS: Sonu Solomon is a 89 y.o. male who was admitted to [facility], then went to TCU. He just recently moved into [facility] and will be followed by in house provider service. He has PMH of HTN, chronic DAVID infection, severe mitral valve prolapse, regurgitation, hyponatremia, aortic stenosis.     Today Mr. Solomon is seen in his apartment today with two daughters present Aparna Nayak and Catia. He has 6 children total, two boys and 4 girls. He says he is doing well and the pain in his chest he had in tcu is now gone. He says he has some shoulder pain at times but not bad recently. He is not having any shortness of breath. He gets lightheaded when he gets up really quick. It passes quickly then. He denies any fevers, chills, nausea, vomiting, or indigestion recently. He did sleep well last night and has been falling asleep quickly. He uses the urinal at night and says he got up twice to go to the bathroom. Aparna Nayak says her sister has been staying with him the last couple nights in his apartment and he has only been getting up once. They feel his nighttime urinating issue is much improved. He is not so sure and at first is a little argumentative about this saying the previous med he was on was helping him more but he does eventually calm down when discussing what was observed during tcu and from  his daughter. He is agreeable to continuing his meds as is. He says he has a slight cough at times and some nasal congestion and allergies. He doesn't want any allergy meds but says his daughter brought him some nasal spray and he uses this intermittently. The daughters found it and it is saline nasal spray. They said at one time he was using flonase but now just the nasal spray. He says he has been attending bingo and some activities. He has made friends and attends meals in the dining rawls for lunch and dinner. He has a good appetite. He denies any swelling in his legs. He says he has some help in his apartment. His daughter says his family is setting up meds, doing laundry, and cleaning apartment. He has a shower twice a week with home health aid and once [facility] home health is finished they will have the aids here at [facility] do this. He is going to have PT for 6 weeks through [facility] and OT for 4 weeks. His daughter states he is due to see dentist in fall. He was following with urology once a year to check psa and follow up on the nocturia. He also follows with dermatology for history of basal cell carcinoma and mostly on his face. His daughters are wondering if he could stop seeing his urologist and just be managed here by primary. They are also wondering if he could stop seeing his dermatologist and have skin checks done by primary and if there is a spot that is concerning then could go to dermatology. He does still see his dentist but wondering if there is an in house dentist. His daughters are also wondering about the cardiologist for his mitral valve issues and since they are not pursuing any surgical intervention if he really needs to be monitored every year for this. Discussed and will forgo any further cardiology follow ups and just monitor symptoms at this time. They would like their dad to be educated on what to expect as this deteriorates. His daughters also wanted to discuss meds and whether it is  necessary for him to still take aspirin and his multivitamin. After discussion it was decided to stop these. We discussed risks v benefits. He has a difficult time swallowing the vitamin anyways. His daughters are wondering if his weights and vitals can be monitored weekly to keep a closer eye on his o2 sats and hopefully catch earlier when he is getting sick. His daughters are concerned about him catching illnesses too easily and wondering if there is a way for staff to try to help prevent this. They are wondering if staff can alert them when there is stuff going around and maybe encourage him to eat in room if there are a lot of residents ill. His daughters are also wondering if his anemia could be related to his MAC condition. They would like his hemoglobin rechecked to see how it is trending.     REVIEW OF SYSTEMS:  PROBLEMS AND REVIEW OF SYSTEMS:   Today on ROS:   Currently, no fever, chills, or rigors. Decreased vision, hearing intact. Denies any chest pain, headaches, palpitations, lightheadedness, dizziness, shortness of breath. Appetite is good. Denies any GERD symptoms. Denies any difficulty with swallowing, nausea, or vomiting.  Denies any abdominal pain, diarrhea or constipation. No insomnia. No active bleeding. No rash. Positive for forgetfulness, urinary frequency, nocturia, urgency, incontinence at times, nasal congestion    Allergies   Allergen Reactions   ? Hydrocodone-Acetaminophen Nausea Only   ? Tree And Shrub Pollen Other (See Comments)     Sneezing from evergreen tree pollen in winter.     Current Outpatient Medications   Medication Sig   ? sodium chloride (OCEAN) 0.65 % nasal spray Apply 1 spray into each nostril as needed for congestion.   ? acetaminophen (TYLENOL) 500 MG tablet Take 1,000 mg by mouth 3 (three) times a day as needed for pain.   ? carvedilol (COREG) 6.25 MG tablet Take 1 tablet (6.25 mg total) by mouth 2 (two) times a day with meals.   ? cyanocobalamin (VITAMIN B-12) 500 MCG  tablet Take 500 mcg by mouth daily.   ? lisinopril (PRINIVIL,ZESTRIL) 2.5 MG tablet Take 1 tablet (2.5 mg total) by mouth daily.   ? oxybutynin (DITROPAN XL) 10 MG ER tablet Take 10 mg by mouth daily.     Past Medical History:    Past Medical History:   Diagnosis Date   ? Arrhythmia     PAC's   ? Hypertension    ? Valvular disease     MVP with mod-severe MR           PHYSICAL EXAMINATION  Vitals:    08/01/19 0700   BP: 110/72   Pulse: 60   Resp: 18   Temp: 98.3  F (36.8  C)   SpO2: 96%   Weight: 105 lb (47.6 kg)       Today on physical exam:     GENERAL: Awake, Alert, oriented x3, not in any form of acute distress, answers questions appropriately, follows simple commands, conversant, small frame  HEENT: Head is normocephalic with normal hair distribution. No evidence of trauma. Ears: No acute purulent discharge. Eyes: Conjunctivae pink with no scleral jaundice. Nose: Normal mucosa and septum. NECK: Supple with no cervical or supraclavicular lymphadenopathy. Trachea is midline. [place]  CHEST: No tenderness or deformity, no crepitus  LUNG: dim to auscultation with good chest expansion. There are no crackles or wheezes, normal AP diameter.  BACK: No kyphosis of the thoracic spine. Symmetric, no curvature, ROM normal, no CVA tenderness, no spinal tenderness   CVS: There is good S1  S2, regular rhythm, murmur present,  2+ pulses symmetric in all extremities.  ABDOMEN: concave and soft, nontender to palpation, non distended, no masses, no organomegaly, good bowel sounds, no rebound or guarding, no peritoneal signs.   EXTREMITIES: No pedal edema, Atraumatic. Full range of motion on both upper and lower extremities, there is no tenderness to palpation, no cyanosis or clubbing, no calf tenderness.  Pulses equal in all extremities, normal cap refill, no joint swelling.  SKIN: Warm and dry, no erythema noted.  Skin color, texture, no rashes or lesions.  NEUROLOGICAL: The patient is oriented to person, place and time, forgetful at  times.           LABS:   No results found for this or any previous visit (from the past 168 hour(s)).  Results for orders placed or performed in visit on 07/19/19   Basic Metabolic Panel   Result Value Ref Range    Sodium 136 136 - 145 mmol/L    Potassium 4.2 3.5 - 5.0 mmol/L    Chloride 103 98 - 107 mmol/L    CO2 28 22 - 31 mmol/L    Anion Gap, Calculation 5 5 - 18 mmol/L    Glucose 75 70 - 125 mg/dL    Calcium 8.8 8.5 - 10.5 mg/dL    BUN 17 8 - 28 mg/dL    Creatinine 0.68 (L) 0.70 - 1.30 mg/dL    GFR MDRD Af Amer >60 >60 mL/min/1.73m2    GFR MDRD Non Af Amer >60 >60 mL/min/1.73m2         Lab Results   Component Value Date    WBC 8.2 07/19/2019    HGB 10.7 (L) 07/19/2019    HCT 33.9 (L) 07/19/2019    MCV 91 07/19/2019     07/19/2019       Lab Results   Component Value Date    QAOBQXGP11 657 07/19/2019     No results found for: HGBA1C  Lab Results   Component Value Date    INR 1.05 03/13/2018     No results found for: UVAKIAZC80TS  Lab Results   Component Value Date    TSH 2.04 05/28/2015           ASSESSMENT/PLAN:    1. Chronic bronchiectasis, chronic MAC: recent pneumonia. F/u with ID prn only.  Lungs clear, o2 sats stable. Family requesting weekly vitals to monitor closely, avoiding public places, avoiding ill contacts. Hand  in apartment. Will discuss with staff if able to notify family if illnesses going around or to avoid dining rawls.   2. HTN: SBP 110s. On coreg, lisinopril.   3. Aortic stenosis, severe mitral valve prolapse, regurgitation: On aspirin, coreg, lisinopril. No cardiology follow up at this time. Stable, progressing over time and was just monitoring. Non surgical candidate. Discussed risk v benefit aspirin therapy and decided to stop.   4. Overactive bladder: On oxybutynin and now stable. Only 1-2 episodes of nocturia per night. Family requested PSA will check with next hg.   5. Vitamin b 12 deficiency: On vitamin b12. Last b12 level on chart from February 545. Check level on  7/.  6. Anorexia: resolved now. [phone].   7. Anemia of chronic disease: Hg  11.6 on 7/4-10.7 on 7/19. Recheck hg in 4 weeks.   8. Allergic rhinitis: saline nasal spray prn.     Electronically signed by: Estephania Espinosa NP     Total floor/unit time spent 60 min with 40 min spent on counseling and coordination of care. Counseling regarding med changes, risk v benefit of aspirin therapy, multivitamin, oab treatment, lab monitoring, anemia. discussed services for AL, home care, specialty care follow ups. Coordinated care with nursing, family for management of specialty care follow ups including dermatology, urology, cardiology, dentist, eye doctor, med changes, services for independent living, allergic rhinitis management, lab monitoring.

## 2021-06-19 NOTE — LETTER
Letter by Reyes Roche DO at      Author: Reyes Roche DO Service: -- Author Type: --    Filed:  Encounter Date: 7/9/2019 Status: (Other)         Patient: Sonu Solomon   MR Number: [id number]   YOB: 1927   Date of Visit: 7/9/2019     [facility]      Facility:    [facility] [[id number]]  Code Status: DNR      Chief Complaint/Reason for Visit:  Chief Complaint   Patient presents with   ? H & P     Chronic M AI infection/nodular bronchiectasis with exacerbation.  Acute hypoxic respiratory failure weaned off oxygen, hypertension, severe mitral valve prolapse regurg, possible cognitive impairment with family concerns about dementia with mild delirium.       HPI:   Sonu is a 89 y.o. male who was recently admitted to the hospital on 7/3/2019.  He did have community-acquired pneumonia with bronchiectasis and DAVID infection which is chronic.  He did have acute respiratory failure and he had not been on oxygen at home he was put on oxygen and he was placed on antibiotics.  He was put on Ceftin ear at this time and did finish his azithromycin.  Overall his condition improved and was weaned off the oxygen did feel better.  He does have high blood pressure and continue his Coreg 2 times a day as well as lisinopril his blood pressure was well controlled at this time.  He does have mitral valve and prolapse with regurg but is been stable at this time.  He was treated probably and transferred here to the TCU at [place] in stable condition to finish off his antibiotics and undergo physical and occupational therapy as well as some cognitive testing.    Patient did have some mild delirium on admission the concern was about some dementia.  His slums was 19 at this time and there was concern of his current living conditions.  He had lost some weight and is still driving at this time.    Patient is up in chair and doing pretty well at this time.  He still does  have a cough and his main concerns today is frequency with urine.  He has been on an over-the-counter medication for some time and they did start Detrol in the hospital but this does not seem to be working.  He has had prostate cancer in the past which is concerning and he does have urgency also.  He has no fevers chills nausea or vomiting.    Past Medical History:  Past Medical History:   Diagnosis Date   ? Arrhythmia     PAC's   ? Hypertension    ? Valvular disease     MVP with mod-severe MR           Surgical History:  Past Surgical History:   Procedure Laterality Date   ? APPENDECTOMY Right    ? INCISIONAL HERNIA REPAIR Left 05/23/2008    Surgeon Dr. Brandon at [facility].       Family History:   History reviewed. No pertinent family history.    Social History:    Social History     Socioeconomic History   ? Marital status:      Spouse name: None   ? Number of children: None   ? Years of education: None   ? Highest education level: None   Occupational History   ? None   Social Needs   ? Financial resource strain: None   ? Food insecurity:     Worry: None     Inability: None   ? Transportation needs:     Medical: None     Non-medical: None   Tobacco Use   ? Smoking status: Former Smoker     Types: Pipe   ? Smokeless tobacco: Never Used   Substance and Sexual Activity   ? Alcohol use: None   ? Drug use: No   ? Sexual activity: None   Lifestyle   ? Physical activity:     Days per week: None     Minutes per session: None   ? Stress: None   Relationships   ? Social connections:     Talks on phone: None     Gets together: None     Attends Moravian service: None     Active member of club or organization: None     Attends meetings of clubs or organizations: None     Relationship status: None   ? Intimate partner violence:     Fear of current or ex partner: None     Emotionally abused: None     Physically abused: None     Forced sexual activity: None   Other Topics Concern   ? None   Social History Narrative   ?  None          Review of Systems   Constitutional:        Review of systems is positive for urgency frequency without burning and suprapubic discomfort.  He is incontinent of urine at this time but not of stool.  He denies any fevers chills nausea vomiting diarrhea change in vision hearing taste or smell weakness one side the other chest pain shortness of breath depression or anxiety.  He is actually doing well with his good quality of life and looking at him he does appear younger than his stated age.       Vitals:    07/09/19 0901   BP: 109/67   Pulse: 64   Resp: 16   Temp: (!) 96.1  F (35.6  C)   SpO2: 96%       Physical Exam   Constitutional: He appears well-nourished. No distress.   HENT:   Head: Normocephalic and atraumatic.   Nose: Nose normal.   Mouth/Throat: No oropharyngeal exudate.   Eyes: Conjunctivae are normal. Right eye exhibits no discharge. Left eye exhibits no discharge.   Neck: Neck supple. No thyromegaly present.   Cardiovascular: Normal rate and regular rhythm. Exam reveals no gallop and no friction rub.   Murmur heard.  Pulmonary/Chest: Effort normal and breath sounds normal. No respiratory distress. He has no wheezes. He has no rales.   Abdominal: Soft. Bowel sounds are normal. He exhibits no distension.   Patient did have some tenderness over the suprapubic area.  No rebound or guarding and upper quadrant his abdomen and lower quadrant of his abdomen with the exception of the pubic symphysis tenderness did not reveal any tenderness or rebound or guarding.   Musculoskeletal: He exhibits no edema or tenderness.   Lymphadenopathy:     He has no cervical adenopathy.   Neurological: He is alert. No cranial nerve deficit. He exhibits normal muscle tone.   Skin: Skin is warm and dry. He is not diaphoretic.   Psychiatric: He has a normal mood and affect. His behavior is normal.       Medication List:  Current Outpatient Medications   Medication Sig   ? aspirin 81 mg chewable tablet Chew 81 mg daily.    ? carvedilol (COREG) 6.25 MG tablet Take 1 tablet (6.25 mg total) by mouth 2 (two) times a day with meals.   ? cefdinir (OMNICEF) 300 MG capsule Take 1 capsule (300 mg total) by mouth 2 times a day at 6:00 am and 4:00 pm for 6 days.   ? cyanocobalamin (VITAMIN B-12) 500 MCG tablet Take 500 mcg by mouth daily.   ? lisinopril (PRINIVIL,ZESTRIL) 2.5 MG tablet Take 1 tablet (2.5 mg total) by mouth daily.   ? multivitamin with minerals (THERA-M) 9 mg iron-400 mcg Tab tablet Take 1 tablet by mouth daily.   ? oxybutynin (DITROPAN) 5 MG tablet Take 5 mg by mouth daily as needed.       Labs: Laboratory values from 7/4/2019 was follows sputum culture was negative for Acid-fast bacilli, white count was 10.1, hemoglobin 11.6, platelets of 215,000.  Sodium is 143, potassium 4.0, CO2 is 30, calcium is 8.6, BUN was 24, creatinine 0.72, GFR is green 60, troponin 0 0.06.      Assessment:    ICD-10-CM    1. Bronchiectasis with (acute) exacerbation (H) J47.1    2. Acute and chronic respiratory failure with hypoxia (H) J96.21    3. Essential hypertension I10    4. Cognitive impairment R41.89        Plan: Plan at this time I will get a UA UC stat second urgency frequency and suprapubic tenderness that I did find on physical exam.  White count be done today secondary infection and will check postvoid residuals x3 days.  I did explain the patient with his daughter in the room the diagram of outflow obstruction possible as well as the possible with inflammation due to urinary tract infection could be causing this.  Speech therapy will evaluate and treat for cognition and will continue with physical and Occupational Therapy.  His lungs are clear but he still has a cough which is expected secondary to his MAC and we will continue to monitor above medical problems.  58 minutes was spent on this admission with greater than 50% of the time dedicated to coordination care discussing case with patient's daughter and him in the room face-to-face  as well as formulating plan of care which all are in agreement at this time.    Follow-up tomorrow should include attention to the UA UC as well as the white count and checking with nurses on post void residuals.        Electronically signed by: Reyes Roche DO

## 2021-06-19 NOTE — LETTER
Letter by Estephania Espinosa NP at      Author: Estephania Espinosa NP Service: -- Author Type: --    Filed:  Encounter Date: 7/15/2019 Status: (Other)         Patient: Sonu Solomon   MR Number: [id number]   YOB: 1927   Date of Visit: 7/15/2019                 [facility]    DATE: 7/15/2019    NAME:  Sonu Solomon             :  1927  MRN: [id number]  CODE STATUS:  DNR/DNI    VISIT TYPE: Problem Visit (urinary frequency, urgency, incontinence)     [facility] [[id number]]       CHIEF COMPLAIN/REASON FOR VISIT:    Chief Complaint   Patient presents with   ? Problem Visit     urinary frequency, urgency, incontinence               HISTORY OF PRESENT ILLNESS: Sonu Solomon is a 89 y.o. male who was admitted to [facility] 7/3- for worsening shortness of breath. CT showed debris in left mainstem bronchus, bronchi to left lower lobe, new airspace disease in both upper lobes. He has history of chronic DAVID infection/nodular bronchiectasis. He was treated for pneumonia and exacerbation. He was weaned from oxygen by discharge and was treated with azithromycin and omnicef. He had some delirium during stay and it was suspected he had underlying dementia. His slums score in hospital was 19. He had preivously been struggling at home to remember to eat and had lost a significant amoutn of weight. He was driving but would frequently get lost. He was discharged to [facility]. [facility] He has PMH of HTN, chronic DAVID infection, severe mitral valve prolapse, regurgitation, hyponatremia, aortic stenosis. Prior to this he lived at home and was still driving.     Today Mr. Solomon is seen for follow up visit on urinary incontinence and frequency. He was reporting the same concerns over the weekend as last week to the staff. He said that he was having urinary frequency, urgency, incontinence, nocturia that was all new for  him, however per his daughter's report last week this has been going on for several years. He was asking staff for the supplement he took at home to be restarted but he cannot recall the name of it. Staff report he has been getting up much less frequently during the night to urinate than before and seems to be going in larger amounts. He was previously getting up 5-7 times per night or even every hour but now he is only getting up 2-3 times per night. Nursing today notes that his daughter mentioned something yesterday about wanting him switched to the long acting oxybutynin. On exam today he is seen in his room and reports that he got up 3 times to go to the bathroom last night. He says that he had an awful night and couldn't sleep because he had to urinate so often. He says this urinating at night is new for him and he is having to go every hour during the day. Staff report he is not urinating that often. He says he was taking something at home that fixed this for him and he could tell if he didn't take it at bedtime that he would have to go a lot more. He later admits he was getting up to go to the bathroom 2-3 times per night at home as well and has been taking something for urinating frequently for many years. He cannot recall what medicine this was but says his daughter threw it away and he wants it back. He also says he wants to go back to his home and his daughter set up a tour of the apartments in the assisted living today. He does not want to move and doesn't think he needs to. He thinks he is still safe at home and thinks he can still drive. Discussed his safety concerns, memory issues, forgetting to eat, forgetting appointments, meds, etc. He becomes upset and says he never did any of that and he is perfectly safe at home because he has a life alert system. Therapy present as well and also reiterates that there are too many safety concerns for returning home alone and recommending he consider moving to  assisted living. He says he will tell his daughters today he doesn't want to move but he thinks they will tell him too bad and not listen to him. He denies any concerns with bowels, shortness of breath, cough, dizziness or other concerns. He says he walks so well he doesn't even have to use a walker.     REVIEW OF SYSTEMS:  PROBLEMS AND REVIEW OF SYSTEMS:   Today on ROS:   Currently, no fever, chills, or rigors. Decreased vision, hearing intact. Denies any chest pain, headaches, palpitations, lightheadedness, dizziness, shortness of breath. Appetite is good. Denies any GERD symptoms. Denies any difficulty with swallowing, nausea, or vomiting.  Denies any abdominal pain, diarrhea or constipation. No insomnia. No active bleeding. No rash. Positive for forgetfulness, urinary frequency, nocturia, urgency, incontinence-appears at baseline, anxious at times      Allergies   Allergen Reactions   ? Hydrocodone-Acetaminophen Nausea Only   ? Tree And Shrub Pollen Other (See Comments)     Sneezing from evergreen tree pollen in winter.     Current Outpatient Medications   Medication Sig   ? oxybutynin (DITROPAN XL) 10 MG ER tablet Take 10 mg by mouth daily.   ? aspirin 81 mg chewable tablet Chew 81 mg daily.   ? carvedilol (COREG) 6.25 MG tablet Take 1 tablet (6.25 mg total) by mouth 2 (two) times a day with meals.   ? cyanocobalamin (VITAMIN B-12) 500 MCG tablet Take 500 mcg by mouth daily.   ? lisinopril (PRINIVIL,ZESTRIL) 2.5 MG tablet Take 1 tablet (2.5 mg total) by mouth daily.   ? multivitamin with minerals (THERA-M) 9 mg iron-400 mcg Tab tablet Take 1 tablet by mouth daily.     Past Medical History:    Past Medical History:   Diagnosis Date   ? Arrhythmia     PAC's   ? Hypertension    ? Valvular disease     MVP with mod-severe MR           PHYSICAL EXAMINATION  Vitals:    07/14/19 2111   BP: 123/73   Pulse: 66   Resp: 18   Temp: 96.8  F (36  C)   SpO2: 92%       Today on physical exam:     GENERAL: Awake, Alert, oriented  x3, not in any form of acute distress, answers questions appropriately, follows simple commands, conversant, small frame  HEENT: Head is normocephalic with normal hair distribution. No evidence of trauma. Ears: No acute purulent discharge. Eyes: Conjunctivae pink with no scleral jaundice. Nose: Normal mucosa and septum. NECK: Supple with no cervical or supraclavicular lymphadenopathy. Trachea is midline.   CHEST: No tenderness or deformity, no crepitus  LUNG: dim to auscultation with good chest expansion. There are no crackles or wheezes, normal AP diameter.  BACK: No kyphosis of the thoracic spine. Symmetric, no curvature, ROM normal, no CVA tenderness, no spinal tenderness   CVS: There is good S1  S2, regular rhythm, murmur present,  2+ pulses symmetric in all extremities.  ABDOMEN: concave and soft, nontender to palpation, non distended, no masses, no organomegaly, good bowel sounds, no rebound or guarding, no peritoneal signs.   EXTREMITIES: No pedal edema, Atraumatic. Full range of motion on both upper and lower extremities, there is no tenderness to palpation, no cyanosis or clubbing, no calf tenderness.  Pulses equal in all extremities, normal cap refill, no joint swelling.  SKIN: Warm and dry, no erythema noted.  Skin color, texture, no rashes or lesions.  NEUROLOGICAL: The patient is oriented to person, place and time, forgetful at times.           LABS:   Recent Results (from the past 168 hour(s))   Urinalysis   Result Value Ref Range    Color, UA Yellow Colorless, Yellow, Straw, Light Yellow    Clarity, UA Clear Clear    Glucose, UA Negative Negative    Bilirubin, UA Negative Negative    Ketones, UA Negative Negative    Specific Gravity, UA 1.018 1.001 - 1.030    Blood, UA Negative Negative    pH, UA 6.5 4.5 - 8.0    Protein, UA Negative Negative mg/dL    Urobilinogen, UA <2.0 E.U./dL <2.0 E.U./dL, 2.0 E.U./dL    Nitrite, UA Negative Negative    Leukocytes, UA Negative Negative   Culture, Urine   Result  Value Ref Range    Culture No Growth    White Blood Count (WBC)   Result Value Ref Range    WBC 6.2 4.0 - 11.0 thou/uL     Results for orders placed or performed during the hospital encounter of 07/03/19   Basic Metabolic Panel   Result Value Ref Range    Sodium 143 136 - 145 mmol/L    Potassium 4.0 3.5 - 5.0 mmol/L    Chloride 108 (H) 98 - 107 mmol/L    CO2 30 22 - 31 mmol/L    Anion Gap, Calculation 5 5 - 18 mmol/L    Glucose 85 70 - 125 mg/dL    Calcium 8.6 8.5 - 10.5 mg/dL    BUN 24 8 - 28 mg/dL    Creatinine 0.72 0.70 - 1.30 mg/dL    GFR MDRD Af Amer >60 >60 mL/min/1.73m2    GFR MDRD Non Af Amer >60 >60 mL/min/1.73m2         Lab Results   Component Value Date    WBC 6.2 07/09/2019    HGB 11.6 (L) 07/04/2019    HCT 37.2 (L) 07/04/2019    MCV 93 07/04/2019     07/04/2019       Lab Results   Component Value Date    [id number] 546 02/15/2019     No results found for: HGBA1C  Lab Results   Component Value Date    INR 1.05 03/13/2018     No results found for: [id number]  Lab Results   Component Value Date    TSH 2.04 05/28/2015           ASSESSMENT/PLAN:    1. Community acquired pneumonia, Acute on chronic bronchiectasis, chronic DAVID infection: completed cefdinir 7/12. Cough resolved. No shortness of breath. No hypoxia, fevers, chills. Much improved. F/u with ID prn only. No concerns today.   2. HTN: SBP 120s. On coreg, lisinopril. hold parameters. No orthostasis in therapy thus far.   3. Aortic stenosis, severe mitral valve prolapse, regurgitation: On aspirin, coreg, lisinopril. Cardiac appt postponed.    4. Overactive bladder: Reports urinary urgency, frequency, nocturia, incontinence at times. Long term problem, very forgetful and does not recall this is not new for him. Has been on oxybutynin and over the counter meds in past. recent Urine culture no growth on 7/9. staff report improvement in number of times and amounts urinating since starting oxybutynin, will change to long acting. Was increased to two  times a day last week but staff ordered to increase to 10mg at night. Will keep and just change to long acting at this time.    5. Vitamin b 12 deficiency: On vitamin b12. Last b12 level on chart from February 545. Will consider rechecking with next lab draw.   6. Anorexia: recent weight loss, forgetting to eat. Down to 102lbs, 108-105lbs . Will monitor 3 times weekly.     Labs recently done 7/4 and 7/9 stable    Per therapy PT- SBA/S t/f's , 400 ft SBA no AD, RAIMUNDO 39/56 - OT - ADL - SBA, safety 15/20,  SLUMS 14/30 ST - 20/30 MOCA  appears inflated based upon observation of completing tasks. Green tag. LCD 7/22. Lives in [facility] - would like to move to [facility]. CHIDI camilo PT, OT, HHA.     Electronically signed by: Estephania Espinosa NP

## 2021-06-19 NOTE — LETTER
Letter by Estephania Espinosa NP at      Author: Estephania Espinosa NP Service: -- Author Type: --    Filed:  Encounter Date: 2019 Status: (Other)         Patient: Sonu Solomon   MR Number: [id number]   YOB: 1927   Date of Visit: 2019                 [facility]    DATE: 2019    NAME:  Sonu Solomon             :  1927  MRN: [id number]  CODE STATUS:  DNR/DNI    VISIT TYPE: Problem Visit (pain)     FACILITY:  [facility] [[id number]]       CHIEF COMPLAIN/REASON FOR VISIT:    Chief Complaint   Patient presents with   ? Problem Visit     pain               HISTORY OF PRESENT ILLNESS: Sonu Solomon is a 89 y.o. male who was admitted to [facility] 7/3- for worsening shortness of breath. CT showed debris in left mainstem bronchus, bronchi to left lower lobe, new airspace disease in both upper lobes. He has history of chronic DAVID infection/nodular bronchiectasis. He was treated for pneumonia and exacerbation. He was weaned from oxygen by discharge and was treated with azithromycin and omnicef. He had some delirium during stay and it was suspected he had underlying dementia. His slums score in hospital was 19. He had preivously been struggling at home to remember to eat and had lost a significant amoutn of weight. He was driving but would frequently get lost. He was discharged to [facility] He has PMH of HTN, chronic DAVID infection, severe mitral valve prolapse, regurgitation, hyponatremia, aortic stenosis. Prior to this he lived at home and was still driving.     Today Mr. Solomon is seen for concerns of left chest, shoulder, arm pain. He mentioned this to nursing this morning and did not have anything ordered so nursing gave standing house tylenol order. Nursing reports he is complaining a lot less about his urinary problems and not bringing it up to staff any more. They think his symptoms  must be better and believe he only got up 1-2 times last night. On exam he is seen in his recliner in room. He says he did get up a couple times last night to urinate and has been keeping track. He shows provider the log he is keeping of how often he urinates and it was less last night and only 2 times instead of the usual 3-5. He again says he took something at home but can't remember the name. Discussed this is improving and will continue current medication. He says he had some left sided chest pain last evening after therapy and it was still there this morning so he asked for something for it. He says it is gone now and the tylenol was effective. He says it hurt when he took a deep breath or when he pushed on the area. He denies shortness of breath, cough, pain anywhere else. He did have some pain in his left shoulder as well. He slept well and no problems with his bowels. He goes about twice per day. He says he toured the apartments yesterday and is not happy about moving but says his daughters are telling him he needs to. He thinks he will be able to continue driving and wants to know if he can park his car in the garage. He wants to be able to go out and about when he wants. Discussed safety concerns, memory issues, cognitive issues and do not recommend driving. He says he will take the driving test and if he passes he plans to continue to drive. He thinks he had no problems and is safe driving. He has a care conference today.     REVIEW OF SYSTEMS:  PROBLEMS AND REVIEW OF SYSTEMS:   Today on ROS:   Currently, no fever, chills, or rigors. Decreased vision, hearing intact. Denies any chest pain, headaches, palpitations, lightheadedness, dizziness, shortness of breath. Appetite is good. Denies any GERD symptoms. Denies any difficulty with swallowing, nausea, or vomiting.  Denies any abdominal pain, diarrhea or constipation. No insomnia. No active bleeding. No rash. Positive for forgetfulness, urinary frequency,  nocturia, urgency, incontinence-improving, left sided chest wall pain, shoulder pain now resolved      Allergies   Allergen Reactions   ? Hydrocodone-Acetaminophen Nausea Only   ? Tree And Shrub Pollen Other (See Comments)     Sneezing from evergreen tree pollen in winter.     Current Outpatient Medications   Medication Sig   ? acetaminophen (TYLENOL) 500 MG tablet Take 1,000 mg by mouth 3 (three) times a day as needed for pain.   ? aspirin 81 mg chewable tablet Chew 81 mg daily.   ? carvedilol (COREG) 6.25 MG tablet Take 1 tablet (6.25 mg total) by mouth 2 (two) times a day with meals.   ? cyanocobalamin (VITAMIN B-12) 500 MCG tablet Take 500 mcg by mouth daily.   ? lisinopril (PRINIVIL,ZESTRIL) 2.5 MG tablet Take 1 tablet (2.5 mg total) by mouth daily.   ? multivitamin with minerals (THERA-M) 9 mg iron-400 mcg Tab tablet Take 1 tablet by mouth daily.   ? oxybutynin (DITROPAN XL) 10 MG ER tablet Take 10 mg by mouth daily.     Past Medical History:    Past Medical History:   Diagnosis Date   ? Arrhythmia     PAC's   ? Hypertension    ? Valvular disease     MVP with mod-severe MR           PHYSICAL EXAMINATION  Vitals:    07/18/19 0700   BP: 105/76   Pulse: 76   Resp: 18   Temp: 98  F (36.7  C)   SpO2: 93%       Today on physical exam:     GENERAL: Awake, Alert, oriented x3, not in any form of acute distress, answers questions appropriately, follows simple commands, conversant, small frame  HEENT: Head is normocephalic with normal hair distribution. No evidence of trauma. Ears: No acute purulent discharge. Eyes: Conjunctivae pink with no scleral jaundice. Nose: Normal mucosa and septum. NECK: Supple with no cervical or supraclavicular lymphadenopathy. Trachea is midline.   CHEST: No tenderness or deformity, no crepitus  LUNG: dim to auscultation with good chest expansion. There are no crackles or wheezes, normal AP diameter.  BACK: No kyphosis of the thoracic spine. Symmetric, no curvature, ROM normal, no CVA  tenderness, no spinal tenderness   CVS: There is good S1  S2, regular rhythm, murmur present,  2+ pulses symmetric in all extremities.  ABDOMEN: concave and soft, nontender to palpation, non distended, no masses, no organomegaly, good bowel sounds, no rebound or guarding, no peritoneal signs.   EXTREMITIES: No pedal edema, Atraumatic. Full range of motion on both upper and lower extremities, there is no tenderness to palpation, no cyanosis or clubbing, no calf tenderness.  Pulses equal in all extremities, normal cap refill, no joint swelling.  SKIN: Warm and dry, no erythema noted.  Skin color, texture, no rashes or lesions.  NEUROLOGICAL: The patient is oriented to person, place and time, forgetful at times.           LABS:   No results found for this or any previous visit (from the past 168 hour(s)).  Results for orders placed or performed during the hospital encounter of 07/03/19   Basic Metabolic Panel   Result Value Ref Range    Sodium 143 136 - 145 mmol/L    Potassium 4.0 3.5 - 5.0 mmol/L    Chloride 108 (H) 98 - 107 mmol/L    CO2 30 22 - 31 mmol/L    Anion Gap, Calculation 5 5 - 18 mmol/L    Glucose 85 70 - 125 mg/dL    Calcium 8.6 8.5 - 10.5 mg/dL    BUN 24 8 - 28 mg/dL    Creatinine 0.72 0.70 - 1.30 mg/dL    GFR MDRD Af Amer >60 >60 mL/min/1.73m2    GFR MDRD Non Af Amer >60 >60 mL/min/1.73m2         Lab Results   Component Value Date    WBC 6.2 07/09/2019    HGB 11.6 (L) 07/04/2019    HCT 37.2 (L) 07/04/2019    MCV 93 07/04/2019     07/04/2019       Lab Results   Component Value Date    [id number] 546 02/15/2019     No results found for: HGBA1C  Lab Results   Component Value Date    INR 1.05 03/13/2018     No results found for: [id number]  Lab Results   Component Value Date    TSH 2.04 05/28/2015           ASSESSMENT/PLAN:    1. Community acquired pneumonia, Acute on chronic bronchiectasis, chronic DAVID infection: completed cefdinir 7/12. Cough resolved. No shortness of breath. No hypoxia, fevers,  chills. Much improved. F/u with ID prn only. No concerns today.   2. HTN: SBP 100s. On coreg, lisinopril. hold parameters. No orthostasis in therapy thus far. Has been well controlled.   3. Aortic stenosis, severe mitral valve prolapse, regurgitation: On aspirin, coreg, lisinopril. Cardiac appt postponed.  F/u after discharge.   4. Overactive bladder: Reports urinary urgency, frequency, nocturia, incontinence at times. Long term problem, very forgetful and does not recall this is not new for him. Previously increased oxybutynin and changed to long acting, only went twice during night. Much improved.   5. Vitamin b 12 deficiency: On vitamin b12. Last b12 level on chart from February 545. Check level on 7/19.  6. Anorexia: recent weight loss, forgetting to eat. Down to 102lbs, 108-105lbs . Will monitor 3 times weekly.   7. Left chest wall strain, musculoskeletal pain: Now resolved with tylenol. No limited rom of shoulder, dull, aching pain, reproducible with touch, painful with deep breathing. No shortness of breath or other symptoms. Ordered tylenol 1000 three times a day prn, ice q4h prn.     Bmp, hm1, vit b 12 on 7/19    Per therapy PT- SBA/S t/f's , 400 ft SBA no AD, EUBANKS 39/56 - OT - ADL - SBA, safety 15/20,  SLUMS 14/30 ST - 20/30 MOCA  appears inflated based upon observation of completing tasks. Green tag. LCD 7/22. Lives in [facility] - plans to move to [facility]. CHIDI camilo PT, OT, HHA. Moving to [facility]    Electronically signed by: Estephania Espinosa NP

## 2021-06-19 NOTE — LETTER
Letter by Johnson, Michael Duane, CNP at      Author: Johnson, Michael Duane, CNP Service: -- Author Type: --    Filed:  Encounter Date: 7/10/2019 Status: (Other)         Patient: Sonu Solomon   MR Number: [id number]   YOB: 1927   Date of Visit: 7/10/2019     [facility]    Facility:   [facility] [[id number]]   Code Status: DNR  PCP: Arturo Ludwig MD   Phone: [phone]   Fax: [phone]      CHIEF COMPLAINT/REASON FOR VISIT:  Chief Complaint   Patient presents with   ? Discharge Summary       HISTORY COURSE:  Sonu is a 89-year-old gentleman who has a history of chronic MAC infection and bronchiectasis who did present to the hospital with worsening shortness of breath.  Prior to that he was seen by his primary doctor had been on Augmentin and later azithromycin.  Recent productive cough and intermittent hemoptysis.  No lower extremity edema.  Also there is been a history of some recent weight loss.  His laboratory studies did show sodium of 147 potassium 4.5 BUN 32 creatinine 1.04 lactic acid 1.3 procalcitonin 0.08 white cells 9.8 hemoglobin 13.6 platelets 256.  The CTA of the chest did show an abundance of mucus debris of the left mainstem bronchus and bronchi to left lower lobes.  No airspace disease involving portions of both upper lobes which was superimposed upon the baseline inflammatory nodules present bilaterally.  No pulmonary emboli.  His assessment and plan did include ceftriaxone and azithromycin scheduled nebs supplemental oxygen infectious disease consult.  Regarding his acute hypoxic respiratory failure he did receive supplemental oxygen as needed.  He also does have severe mitral valve prolapse and regurgitation.  He also has a history of cognitive impairment.  He now is on the transitional care unit and has had a couple of therapy sessions and now will be discharging to a another transitional care unit.  He did finish his  azithromycin 250 mg daily on July 7.  He still on Omnicef until July 12 300 mg twice daily.  He has been normotensive and afebrile and also on room air.  No respiratory issues including shortness of breath with exertion.  He did have a chest x-ray on July 7 which did show chronic interstitial infiltrate along with COPD but no tuberculosis or CHF.    Review of Systems  Currently no reports of fever chills fatigue cough or cold or new other flulike symptoms.  Denies any myalgias arthralgias nausea vomiting diarrhea headache stiff neck or swollen glands.  There were no vitals filed for this visit.  Blood pressure 109/67 pulse 64 respiration 16 temperature 96.1 saturation room air 96% his weight 109 pounds  Physical Exam   Constitutional: No distress.   Thin build   HENT:   Head: Normocephalic.   Eyes: Pupils are equal, round, and reactive to light.   Neck: Neck supple. No thyromegaly present.   Cardiovascular: Normal rate, regular rhythm and normal heart sounds.   Mitral valve prolapse.   Pulmonary/Chest: Breath sounds normal.   Recent diagnosis with pneumonia.   Abdominal: Bowel sounds are normal. There is no tenderness. There is no guarding.   Musculoskeletal:   Generalized weakness.  Denies pain.  Shoulder impingement.   Lymphadenopathy:     He has no cervical adenopathy.   Neurological: He is alert.   Skin: Skin is warm and dry. No rash noted.   Psychiatric: His behavior is normal.     Lab Results   Component Value Date    WBC 6.2 07/09/2019    HGB 11.6 (L) 07/04/2019    HCT 37.2 (L) 07/04/2019    MCV 93 07/04/2019     07/04/2019     Results for orders placed or performed during the hospital encounter of 07/03/19   Basic Metabolic Panel   Result Value Ref Range    Sodium 143 136 - 145 mmol/L    Potassium 4.0 3.5 - 5.0 mmol/L    Chloride 108 (H) 98 - 107 mmol/L    CO2 30 22 - 31 mmol/L    Anion Gap, Calculation 5 5 - 18 mmol/L    Glucose 85 70 - 125 mg/dL    Calcium 8.6 8.5 - 10.5 mg/dL    BUN 24 8 - 28 mg/dL     Creatinine 0.72 0.70 - 1.30 mg/dL    GFR MDRD Af Amer >60 >60 mL/min/1.73m2    GFR MDRD Non Af Amer >60 >60 mL/min/1.73m2       Lab Results   Component Value Date    ALT 19 03/13/2018    AST 34 03/13/2018    ALKPHOS 73 03/13/2018    BILITOT 0.5 03/13/2018     Lab Results   Component Value Date    ALBUMIN 3.1 (L) 03/13/2018     Lab Results   Component Value Date    COLORU Yellow 07/09/2019    CLARITYU Clear 07/09/2019    GLUCOSEU Negative 07/09/2019    BILIRUBINUR Negative 07/09/2019    KETONESU Negative 07/09/2019    SPECGRAV 1.018 07/09/2019    HGBUA Negative 07/09/2019    PHUR 6.5 07/09/2019    PROTEINUA Negative 07/09/2019    UROBILINOGEN <2.0 E.U./dL 07/09/2019    NITRITE Negative 07/09/2019    LEUKOCYTESUR Negative 07/09/2019         MEDICATION LIST:  Current Outpatient Medications   Medication Sig   ? aspirin 81 mg chewable tablet Chew 81 mg daily.   ? carvedilol (COREG) 6.25 MG tablet Take 1 tablet (6.25 mg total) by mouth 2 (two) times a day with meals.   ? cefdinir (OMNICEF) 300 MG capsule Take 1 capsule (300 mg total) by mouth 2 times a day at 6:00 am and 4:00 pm for 6 days.   ? cyanocobalamin (VITAMIN B-12) 500 MCG tablet Take 500 mcg by mouth daily.   ? lisinopril (PRINIVIL,ZESTRIL) 2.5 MG tablet Take 1 tablet (2.5 mg total) by mouth daily.   ? multivitamin with minerals (THERA-M) 9 mg iron-400 mcg Tab tablet Take 1 tablet by mouth daily.   ? oxybutynin (DITROPAN) 5 MG tablet Take 5 mg by mouth daily as needed.       DISCHARGE DIAGNOSIS:    ICD-10-CM    1. Essential hypertension I10    2. Pneumonia of both upper lobes due to infectious organism (H) J18.1    3. Aortic stenosis, mild I35.0    4. MVP (mitral valve prolapse) I34.1    5. General weakness R53.1        MEDICAL EQUIPMENT NEEDS:  None    DISCHARGE PLAN/FACE TO FACE:  I certify that services are/were furnished while this patient was under the care of a physician and that a physician or an allowed non-physician practitioner (NPP), had a  face-to-face encounter that meets the physician face-to-face encounter requirements. The encounter was in whole, or in part, related to the primary reason for home health. The patient is confined to his/her home and needs intermittent skilled nursing, physical therapy, speech-language pathology, or the continued need for occupational therapy. A plan of care has been established by a physician and is periodically reviewed by a physician.  Date of Face-to-Face Encounter: July 10, 2019    I certify that, based on my findings, the following services are medically necessary home health services: He will be discharging to [facility] with current medications with an anticipated discharge date of July 10, 2019 he also receive physical and occupational therapy along with speech therapy.    My clinical findings support the need for the above skilled services because: (Please write a brief narrative summary that describes what the RN, PT, SLP, or other services will be doing in the home. A list of diagnoses in this section does not meet the CMS requirements.)  Secondary to his chronic medical conditions including his recent community-acquired pneumonia diagnosis along with his antibiotics close assessment safety transfers and speech therapy.    This patient is homebound because: (Please write a brief narrative summary describing the functional limitations as to why this patient is homebound and specifically what makes this patient homebound.)  Secondary to multiple chronic medical conditions including his recent CAP along with safety transfers self-care deficits assistance with ADLs and speech therapy.    The patient is, or has been, under my care and I have initiated the establishment of the plan of care. This patient will be followed by a physician who will periodically review the plan of care.    Schedule follow up visit with primary care provider within 7 days to reestablish care.  He will follow-up with the  primary care team at the new transitional care unit to go over his medications as well as any other concerns or problems.    Discharge coordination care greater than 30 minutes.  Electronically signed by: Michael Duane Johnson, CNP

## 2021-06-19 NOTE — LETTER
Letter by Estephania Espinosa NP at      Author: Estephania Espinosa NP Service: -- Author Type: --    Filed:  Encounter Date: 10/30/2019 Status: Signed         Patient: Sonu Solomon   MR Number: [id number]   YOB: 1927   Date of Visit: 10/30/2019                 [facility]    DATE: 10/30/2019    NAME:  Sonu Solomon             :  1927  MRN: [id number]  CODE STATUS:  DNR/DNI    VISIT TYPE: Review Of Multiple Medical Conditions     FACILITY:  [facility] [[id number]]       CHIEF COMPLAIN/REASON FOR VISIT:    Chief Complaint   Patient presents with   ? Review Of Multiple Medical Conditions               HISTORY OF PRESENT ILLNESS: Sonu Solomon is a 89 y.o. male who is a resident of [facility] and will be followed by in house provider service. He has PMH of HTN, chronic DAVID infection, severe mitral valve prolapse, regurgitation, hyponatremia, aortic stenosis.     Today Mr. Solomon is seen for review of systems today. His daughter Aparna Nayak is present today. He says he is doing very well recently and no acute concerns today. He sleeps ok and no pain today. He did have some left wrist pain at times but his daughter recommended soaking it in warm water and he has not complained recently. He still showers himself and his daughters sit outside the bathroom while he does this. He seems to do fine and does not need assistance. They just want to make sure he is ok. He does have dry skin and does use an electric razor to shave. He recently saw the dentist on 10/3 and eye doctor on 10/9. He says he breathes a little harder at times. He has a little cough at times and sometimes will hack up some brown stuff but this is only on occasion. He denies cold symptoms recently or any recent illnesses. He says his memory is not the best but thinks it hasn't changed at all recently. His daughters write out a routine and schedule for him every day to check off everything  he needs to do. This keeps him on task. He usually goes to exercises classes around 9 and seems to be attending more activities and keeping busy. He is drinking ensure every day in the morning and sometimes a half in the evenings. We discussed his appetite and weights. He is eating well and having snacks. They are not sure why his weights continue to drop. He gets up 1-2 times per night to urinate. This seems controlled. He says it is still a problem but his daughter reports it is well controlled. He does often repeat himself during conversation. He does report that he watches what he eats and only eats healthy foods. He was craving some frozen yogurt and had his daughter buy him some fat free frozen yogurt. They tried to convince him to get regular ice cream but he has avoided trans fats for a very long time and still will not eat them. He even shows provider how he reads the food labels. He denies any concerns with his appetite or weight. His daughter reports his wife had heart disease so he was her caretaker and had to choose heart healthy diet foods for them to eat. They deny other concerns with him today. He is not using the tylenol very often.     REVIEW OF SYSTEMS:  PROBLEMS AND REVIEW OF SYSTEMS:   Today on ROS:   Currently, no fever, chills, or rigors. Decreased vision, hearing intact. Denies any chest pain, headaches, palpitations, lightheadedness, dizziness, shortness of breath. Appetite is good. Denies any GERD symptoms. Denies any difficulty with swallowing, nausea, or vomiting.  Denies any abdominal pain, diarrhea or constipation. No insomnia. No active bleeding. No rash. Positive for forgetfulness, urinary frequency, nocturia, incontinence at times but controlle, weight loss    Allergies   Allergen Reactions   ? Hydrocodone-Acetaminophen Nausea Only   ? Tree And Shrub Pollen Other (See Comments)     Sneezing from evergreen tree pollen in winter.     Current Outpatient Medications   Medication Sig   ?  acetaminophen (TYLENOL) 500 MG tablet Take 1,000 mg by mouth 3 (three) times a day as needed for pain.   ? carvedilol (COREG) 6.25 MG tablet Take 1 tablet (6.25 mg total) by mouth 2 (two) times a day with meals.   ? cyanocobalamin (VITAMIN B-12) 500 MCG tablet Take 500 mcg by mouth daily.   ? oxybutynin (DITROPAN XL) 10 MG ER tablet Take 10 mg by mouth daily.   ? sodium chloride (OCEAN) 0.65 % nasal spray Apply 1 spray into each nostril as needed for congestion.     Past Medical History:    Past Medical History:   Diagnosis Date   ? Arrhythmia     PAC's   ? Hypertension    ? Valvular disease     MVP with mod-severe MR           PHYSICAL EXAMINATION  Vitals:    10/30/19 1729   BP: 118/71   Pulse: 79   Resp: 18   Temp: 97  F (36.1  C)   SpO2: 96%   Weight: 99 lb 6.4 oz (45.1 kg)       Today on physical exam:     GENERAL: Awake, Alert, oriented x3, not in any form of acute distress, answers questions appropriately, follows simple commands, conversant, small frame  HEENT: Head is normocephalic with normal hair distribution. No evidence of trauma. Ears: No acute purulent discharge. Eyes: Conjunctivae pink with no scleral jaundice. Nose: Normal mucosa and septum. NECK: Supple with no cervical or supraclavicular lymphadenopathy. Trachea is midline. [place]  CHEST: No tenderness or deformity, no crepitus  LUNG: dim to auscultation with good chest expansion. There are no crackles or wheezes, normal AP diameter.  BACK: No kyphosis of the thoracic spine. Symmetric, no curvature, ROM normal, no CVA tenderness, no spinal tenderness   CVS: There is good S1  S2, regular rhythm, murmur present,  2+ pulses symmetric in all extremities.  ABDOMEN: concave and soft, nontender to palpation, non distended, no masses, no organomegaly, good bowel sounds, no rebound or guarding, no peritoneal signs.   EXTREMITIES: No pedal edema, Atraumatic. Full range of motion on both upper and lower extremities, there is no tenderness to palpation, no  cyanosis or clubbing, no calf tenderness.  Pulses equal in all extremities, normal cap refill, no joint swelling.  SKIN: Warm and dry, no erythema noted.  Skin color, texture, no rashes or lesions.  NEUROLOGICAL: The patient is oriented to person, place and time, more forgetful today. Repeats self frequently.           LABS:   No results found for this or any previous visit (from the past 168 hour(s)).  Results for orders placed or performed in visit on 07/19/19   Basic Metabolic Panel   Result Value Ref Range    Sodium 136 136 - 145 mmol/L    Potassium 4.2 3.5 - 5.0 mmol/L    Chloride 103 98 - 107 mmol/L    CO2 28 22 - 31 mmol/L    Anion Gap, Calculation 5 5 - 18 mmol/L    Glucose 75 70 - 125 mg/dL    Calcium 8.8 8.5 - 10.5 mg/dL    BUN 17 8 - 28 mg/dL    Creatinine 0.68 (L) 0.70 - 1.30 mg/dL    GFR MDRD Af Amer >60 >60 mL/min/1.73m2    GFR MDRD Non Af Amer >60 >60 mL/min/1.73m2         Lab Results   Component Value Date    WBC 8.2 07/19/2019    HGB 13.4 (L) 08/28/2019    HCT 33.9 (L) 07/19/2019    MCV 91 07/19/2019     07/19/2019       Lab Results   Component Value Date    UGLVLOWX33 657 07/19/2019     No results found for: HGBA1C  Lab Results   Component Value Date    INR 1.05 03/13/2018     No results found for: [id number]  Lab Results   Component Value Date    TSH 2.04 05/28/2015           ASSESSMENT/PLAN:    1. Chronic bronchiectasis, chronic DAVID: no further pneumonia. F/u with ID prn only if want to treat chronically with prophylactic antibiotics.  Lungs clear, o2 sats stable. Vitals have been stable, o2 sat today 96% on RA. avoiding public places,  ill contacts as much as possible. Hand  in apartment.  No ID follow up at this time, given age need to weigh risk v benefit of long term high dose antibiotic treatment. Monitor for now. Appears to be stable.   2. HTN: SBP 110s. On coreg. Appears improved today and no further dizziness.   3. Aortic stenosis, severe mitral valve prolapse,  regurgitation: On coreg. No cardiology follow up at this time. Stable, progressing over time and was just monitoring. Non surgical candidate. No recent concerns.   4. Overactive bladder: On oxybutynin and now stable. Only 1-2 episodes of nocturia per night. No recent concerns.   5. Vitamin b 12 deficiency: On vitamin b12. Last b12 level on chart from February 545. Check level on 7/. Stable on supplement.   6. Anorexia, Protein calorie Malnutrition: resolved now. [phone]. Drinking one boost per day, sometimes 1/2 in evening.  Healthy diet, avoids all trans fats. Explained today he does not need to avoid these. Family says they have tried to encourage him to eat higher fat foods but he refuses.   7. Anemia of chronic disease: Hg  11.6 on 7/4-10.7 on 7/19. Last hg was 13.4 on 8/28. Stable.   8. Allergic rhinitis: saline nasal spray prn. No recent concerns.     Bmp, hg in august    Electronically signed by: Estephania Espinosa NP     Total floor/unit time spent 40 min with 25 min spent on counseling and coordination of care. Counseling regarding anorexia, weight loss, management of infection risk, bronchiectasis management, oab management. Counseling regarding dietary habits and increasing fat and caloric intake. Counseling regarding safety concerns and increasing physical activity. Coordinated care with nursing for management of anorexia, supplement recs, malnutrition management, lab monitoring, infection risk management.

## 2021-06-20 NOTE — LETTER
Letter by Estephania Espinosa NP at      Author: Estephania Espinosa NP Service: -- Author Type: --    Filed:  Encounter Date: 2020 Status: Signed         Patient: Sonu Solomon   MR Number: [id number]   YOB: 1927   Date of Visit: 2020                 [facility]    DATE: 2020    NAME:  Sonu Solomon             :  1927  MRN: [id number]  CODE STATUS:  DNR/DNI    VISIT TYPE: Problem Visit (coccyx pain, right elbow and arm pain)     FACILITY:  [facility] [[id number]]       CHIEF COMPLAIN/REASON FOR VISIT:    Chief Complaint   Patient presents with   ? Problem Visit     coccyx pain, right elbow and arm pain               HISTORY OF PRESENT ILLNESS: Sonu Solomon is a 89 y.o. male who is a resident of [facility] and will be followed by in house provider service. He has PMH of HTN, chronic DAVID infection, severe mitral valve prolapse, regurgitation, hyponatremia, aortic stenosis.     Today Mr. Solomon is seen for coccyx pain, right arm and elbow pain. He is seen in apartment with his daughter Aparna loera. She says last week he complained while they were in the nurses office that his bottom was hurting him. She says this was the first time she had heard him complain. He says today that this has been going on for a while. She says she did look back there at one point and his bone sticks out a lot and he has no fat or padding back there. They did buy him some special seat cushions where the middle back part is cut out so it is offloading pressure on his coccyx. She says this morning he woke up and complained of his right arm and elbow hurting. She says he has a high pain tolerance so it is unusual for him to complain. 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 He says he was  told he was fine but his shoulder has bothered him ever since. He says this happened in [place]. He says his right elbow hurts most but the shoulder hurts some too. He says he woke up this morning and it hurt. It especially hurts when he flexes his elbow. He cannot lift it up either and is needing to use his left arm to raise his right arm. He slept on his back so doesn't think he laid on it. It is not red, warm or swollen at all. In regards to his bottom he says this is better with the new cushions. He has trouble sitting on the chairs at activities though or on his seat on his walker. He can sit on the toilet just fine but anything that puts pressure directly on his bottom hurts. He has not taken anything today for pain or to help his arm. He does have some tylenol in his apartment and we discussed scheduling this. His daughter says he will not remember to take the tylenol if it is not just set up for him. He does well taking his meds and no issues with him following his checklist and schedule. He seems to otherwise be doing well. Aparna Nayak says he has someone come see him 3 times a week. He has not messed up his meds at all. We did discuss using ice for his right elbow, resting his right arm for a week, may use sleeve over elbow for support if needed, and take tylenol twice a day. We discussed maybe doing therapy after a week of rest so will revisit him again next week to check in on pain in coccyx and arm. We also discussed using foam dressings for extra padding over coccyx. He did not have any falls, traumatic events for suspicion of fracture. His daughter was wondering about a compression fracture and we discussed even if he did have compression fracture that low that the only treatment would be pain management and offloading cushion as it is too low for a back brace. We did discuss he has not had any change in bowel or bladder habits to indicate concerns for spine or nerve compression. No numb/tingling recently.  Both Oswald and daughter were agreeable to plan of care and all questions answered.     REVIEW OF SYSTEMS:  PROBLEMS AND REVIEW OF SYSTEMS:   Today on ROS:   Currently, no fever, chills, or rigors. Decreased vision, hearing intact. Denies any chest pain, headaches, palpitations, lightheadedness, dizziness, shortness of breath. Appetite is good. Denies any GERD symptoms. Denies any difficulty with swallowing, nausea, or vomiting.  Denies any abdominal pain, diarrhea or constipation. No insomnia. No active bleeding. No rash. Positive for forgetfulness, urinary frequency, nocturia, incontinence at times but controlled, pain in bottom, right arm pain    Allergies   Allergen Reactions   ? Hydrocodone-Acetaminophen Nausea Only   ? Tree And Shrub Pollen Other (See Comments)     Sneezing from evergreen tree pollen in winter.     Current Outpatient Medications   Medication Sig   ? acetaminophen (TYLENOL) 500 MG tablet Take 1,000 mg by mouth 2 (two) times a day.    ? carvedilol (COREG) 6.25 MG tablet Take 1 tablet (6.25 mg total) by mouth 2 (two) times a day with meals.   ? cyanocobalamin (VITAMIN B-12) 500 MCG tablet Take 500 mcg by mouth daily.   ? oxybutynin (DITROPAN XL) 10 MG ER tablet Take 10 mg by mouth daily.   ? sodium chloride (OCEAN) 0.65 % nasal spray Apply 1 spray into each nostril as needed for congestion.     Past Medical History:    Past Medical History:   Diagnosis Date   ? Arrhythmia     PAC's   ? Hypertension    ? 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 No evidence of trauma. Ears: No acute  purulent discharge. Eyes: Conjunctivae pink with no scleral jaundice. Nose: Normal mucosa and septum. NECK: Supple with no cervical or supraclavicular lymphadenopathy. Trachea is midline. [place]  CHEST: No tenderness or deformity, no crepitus  LUNG: dim to auscultation with good chest expansion. There are no crackles or wheezes, normal AP diameter.  BACK: No kyphosis of the thoracic spine. Symmetric, no curvature, ROM normal, no CVA tenderness, no spinal tenderness   CVS: There is good S1  S2, regular rhythm, murmur present,  2+ pulses symmetric in all extremities.  ABDOMEN: concave and soft, nontender to palpation, non distended, no masses, no organomegaly, good bowel sounds, no rebound or guarding, no peritoneal signs.   EXTREMITIES: No pedal edema, Atraumatic. Full range of motion on both upper and lower extremities, there is no tenderness to palpation, no cyanosis or clubbing, no calf tenderness.  Pulses equal in all extremities, normal cap refill, no joint swelling. Right elbow when flexed severe pain and flexor tendon noted to be spasming, limited ROM of right shoulder and elbow, no erythema, edema, ecchymosis  SKIN: Warm and dry, no erythema noted.  Skin color, texture, no rashes or lesions. Examined coccyx region where pain located, pain located central/midline coccyx, directly under bony prominence, skin is pink/red but no warmth, does bjorn, small callous noted over area and appears irritated  NEUROLOGICAL: The patient is oriented to person, place and time, more forgetful today. Repeats self frequently.           LABS:   No results found for this or any previous visit (from the past 168 hour(s)).  Results for orders placed or performed in visit on 07/19/19   Basic Metabolic Panel   Result Value Ref Range    Sodium 136 136 - 145 mmol/L    Potassium 4.2 3.5 - 5.0 mmol/L    Chloride 103 98 - 107 mmol/L    CO2 28 22 - 31 mmol/L    Anion Gap, Calculation 5 5 - 18 mmol/L    Glucose 75 70 - 125 mg/dL    Calcium  8.8 8.5 - 10.5 mg/dL    BUN 17 8 - 28 mg/dL    Creatinine 0.68 (L) 0.70 - 1.30 mg/dL    GFR MDRD Af Amer >60 >60 mL/min/1.73m2    GFR MDRD Non Af Amer >60 >60 mL/min/1.73m2         Lab Results   Component Value Date    WBC 8.2 07/19/2019    HGB 13.4 (L) 08/28/2019    HCT 33.9 (L) 07/19/2019    MCV 91 07/19/2019     07/19/2019       Lab Results   Component Value Date    [id number] 657 07/19/2019     No results found for: HGBA1C  Lab Results   Component Value Date    INR 1.05 03/13/2018     No results found for: [id number]  Lab Results   Component Value Date    TSH 2.04 05/28/2015           ASSESSMENT/PLAN:    1. Right arm flexor tendon strain: spasms noted on exam when flexing right elbow. Previous right shoulder injury. Pain and limited ROM of both elbow and shoulder. Ordered tylenol two times a day scheduled. Rest for one week, no fitness classes or dumbbell lifting for a week. Ice prn. Will re evaluate next week to determine if will benefit from therapy.   2. Coccyx pain, risk for pressure ulcer: pain in coccyx region, red on exam but does bjorn, small calloused area. No instability noted of coccyx. Pain with direct pressure. Pressure offloading cushions. Will order foam dressing for extra support and skin protection. Change prn. Will eval for therapy next week on follow up. Low suspicion for fracture given no traumatic events recently, no neuro symptoms. Even if did have coccyx or sacral fracture unable to brace at that level, already using pressure offloading cushions. Monitor for worsening. Encourage to be off coccyx region.   3. Chronic bronchiectasis, chronic DAVID: no further pneumonia. F/u with ID prn. No recent illness or concerns. o2 sats have been stable on RA.    4. HTN: SBP 120s. On coreg. Stable.   5. Aortic stenosis, severe mitral valve prolapse, regurgitation: On coreg. No cardiology follow up at this time. Stable, progressing over time and was just monitoring. Non surgical candidate. No  recent concerns.   6. Overactive bladder: On oxybutynin and now stable. Only 1-2 episodes of nocturia per night. No recent concerns.   7. Vitamin b 12 deficiency: On vitamin b12. Last b12 level on chart from February 545. Check level on 7/. Stable on supplement.   8. Anorexia, Protein calorie Malnutrition:  [phone]. Drinking one boost per day, sometimes 1/2 in evening.  Healthy diet, avoids all trans fats. Continue to encourage intake, higher fat diet but refuses.    9. Anemia of chronic disease: Hg  11.6 on 7/4-10.7 on 7/19. Last hg was 13.4 on 8/28. Stable.   10. Allergic rhinitis: saline nasal spray prn. No recent concerns.     Bmp, hg in august    Electronically signed by: Estephania Espinosa NP     Total floor/unit time spent 40 min with 25 min spent on counseling and coordination of care. Counseling regarding pain management, evaluation of coccyx region, pain source, pressure ulcer risk, prevention of further skin break down, right arm evaluation and treatment options, flexor tendon strain. Coordinated care with nursing for management of pressure ulcer risk, pain management.

## 2021-06-20 NOTE — LETTER
Letter by Estephania Espinosa NP at      Author: Estephania Espinosa NP Service: -- Author Type: --    Filed:  Encounter Date: 2020 Status: (Other)         Patient: Sonu oSlomon   MR Number: [id number]   YOB: 1927   Date of Visit: 2020                 [facility]    DATE: 2020    NAME:  Sonu Solomon             :  1927  MRN: [id number]  CODE STATUS:  DNR/DNI    VISIT TYPE: Problem Visit (hospital f/u)     FACILITY:  [facility] [[id number]]       CHIEF COMPLAIN/REASON FOR VISIT:    Chief Complaint   Patient presents with   ? Problem Visit     hospital f/               HISTORY OF PRESENT ILLNESS: Sonu Solomon is a 89 y.o. male who is a resident of [facility] and will be followed by in house provider service. He has PMH of HTN, chronic DAVID infection, severe mitral valve prolapse, regurgitation, hyponatremia, aortic stenosis.     Today Mr. Solomon is seen for hospital follow up. His daughter is present in his apartment for visit. He was admitted - for gastroenteritis with Nausea, vomiting, and diarrhea. He was treated for JIMENA and CT scan showed enteritis. He required oxygen overnight but was then weaned off. He did have some hypotension and his coreg dose was reduced. He was discharged back to his apartment. He reports today that he feels great. He is back to himself and no concerns of further nausea, vomiting, or diarrhea. He is eating normally. His weight today was 100lbs, which is great for him even with the illness. His bowels are moving normally now. He drinks his boost once a day still. He is sleeping well and has no pain right now. He does still have pain in his right shoulder intermittently. His buttocks pain is much improved since he started sitting on special cushions. He is also wearing the bandages on his bottom to protect the skin. His daughter remarks that her sister Aparna Nayak was requesting a script for these  bandages so she could get insurance to cover them. His breathing has been good recently and his o2 sat is 96% on room air. He does not feel short of breath or has been coughing more than baseline. He was given an aerobika inhaler device by the hospital and his daughter reports they have been encouraging him to use this for exercising his lungs. Otherwise no other recent concerns and he is feeling much better after being in the hospital.     REVIEW OF SYSTEMS:  PROBLEMS AND REVIEW OF SYSTEMS:   Today on ROS:   Currently, no fever, chills, or rigors. Decreased vision, hearing intact. Denies any chest pain, headaches, palpitations, lightheadedness, dizziness, shortness of breath. Appetite is good. Denies any GERD symptoms. Denies any difficulty with swallowing, nausea, or vomiting.  Denies any abdominal pain, diarrhea or constipation. No insomnia. No active bleeding. No rash. Positive for forgetfulness, urinary frequency, nocturia, incontinence at times but controlled, pain in bottom resolved, pain in right arm intermittent    Allergies   Allergen Reactions   ? Hydrocodone-Acetaminophen Nausea Only   ? Tree And Shrub Pollen Other (See Comments)     Sneezing from evergreen tree pollen in winter.     Current Outpatient Medications   Medication Sig   ? acetaminophen (TYLENOL) 500 MG tablet Take 1,000 mg by mouth 3 (three) times a day.   ? carvediloL (COREG) 3.125 MG tablet Take 1 tablet (3.125 mg total) by mouth 2 (two) times a day with meals.   ? cyanocobalamin (VITAMIN B-12) 500 MCG tablet Take 500 mcg by mouth daily.   ? oxybutynin (DITROPAN XL) 10 MG ER tablet Take 10 mg by mouth at bedtime.    ? sodium chloride (OCEAN) 0.65 % nasal spray Apply 1 spray into each nostril as needed for congestion.     Past Medical History:    Past Medical History:   Diagnosis Date   ? Arrhythmia     PAC's   ? Hypertension    ? 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 No evidence of trauma. Ears: No acute purulent discharge. Eyes: Conjunctivae pink with no scleral jaundice. Nose: Normal mucosa and septum. NECK: Supple with no cervical or supraclavicular lymphadenopathy. Trachea is midline. [place]  CHEST: No tenderness or deformity, no crepitus  LUNG: dim to auscultation with good chest expansion. There are no crackles or wheezes, normal AP diameter.  BACK: No kyphosis of the thoracic spine. Symmetric, no curvature, ROM normal, no CVA tenderness, no spinal tenderness   CVS: There is good S1  S2, regular rhythm, murmur present,  2+ pulses symmetric in all extremities.  ABDOMEN: concave and soft, nontender to palpation, non distended, no masses, no organomegaly, good bowel sounds, no rebound or guarding, no peritoneal signs.   EXTREMITIES: No pedal edema, Atraumatic. Full range of motion on both upper and lower extremities, there is no tenderness to palpation, no cyanosis or clubbing, no calf tenderness.  Pulses equal in all extremities, normal cap refill, no joint swelling.   SKIN: Warm and dry, no erythema noted.    NEUROLOGICAL: The patient is oriented to person, place and time,  forgetful. Repeats self frequently. Appears at baseline today          LABS:   No results found for this or any previous visit (from the past 168 hour(s)).  Results for orders placed or performed during the hospital encounter of 01/30/20   Basic Metabolic Panel   Result Value Ref Range    Sodium 143 136 - 145 mmol/L    Potassium 4.6 3.5 - 5.0 mmol/L    Chloride 105 98 - 107 mmol/L    CO2 26 22 - 31 mmol/L    Anion Gap, Calculation 12 5 - 18 mmol/L    Glucose 128  (H) 70 - 125 mg/dL    Calcium 9.4 8.5 - 10.5 mg/dL    BUN 36 (H) 8 - 28 mg/dL    Creatinine 1.07 0.70 - 1.30 mg/dL    GFR MDRD Af Amer >60 >60 mL/min/1.73m2    GFR MDRD Non Af Amer >60 >60 mL/min/1.73m2         Lab Results   Component Value Date    WBC 6.7 01/30/2020    HGB 13.5 (L) 01/30/2020    HCT 43.0 01/30/2020    MCV 93 01/30/2020     01/30/2020       Lab Results   Component Value Date    [id number] 657 07/19/2019     Lab Results   Component Value Date    HGBA1C 6.1 01/30/2020     Lab Results   Component Value Date    INR 1.05 03/13/2018     No results found for: [id number]  Lab Results   Component Value Date    TSH 2.04 05/28/2015           ASSESSMENT/PLAN:    1. Viral gastroenteritis, JIMENA: Now appears resolved. No further nausea, vomiting, diarrhea and now eating and drinking normally. His skin turgor is good today and has moist mucous membranes. He does not appear dehydrated. We did discuss doing some follow up labs but patient and family feel he is back to normal and no need at this time. Cr 1.07, gfr >60, bun 36 on 1/30.   2. Coccyx pain, risk for pressure ulcer: much improved, using special offloading cushions for chairs and walker. He is wearing protective foam dressing and changing every 3 days and as needed for soiling. Daughter requested script for this and this was given to nursing staff today. No further skin breakdown per patient and family report.   3. Chronic bronchiectasis, chronic DAVID: required o2 when hospitalized overnight. No shortness of breath or cough today. Encouraged to use the aerobika device. Encourage ambulation and sitting upright. No fevers or chills recently.   4. HTN: SBP 130s. On coreg. Dose reduced in patient to 3.125mg two times a day. bp today controlled. Will monitor to see if need to increase once recovered again. No changes today.    5. Aortic stenosis, severe mitral valve prolapse, regurgitation: On coreg. No cardiology follow up at this time. Non surgical  candidate. Denies symptoms today of chest pain, shortness of breath.   6. Overactive bladder: On oxybutynin and now stable. Only 1-2 episodes of nocturia per night. Improved on medication. No recent issues.   7. Vitamin b 12 deficiency: On vitamin b12. Last b12 level on chart from February 545. Check level on 7/. Stable on supplement.   8. Anorexia, Protein calorie Malnutrition:  [phone]. Drinking one boost per day, sometimes 1/2 in evening.  Healthy diet, avoids all trans fats. Continue to encourage intake, higher fat diet but declines. Weight steady today despite recent illness. Continue to encourage itnake.   9. Anemia of chronic disease: Hg  13.5 on 1/30, improved.   10. Allergic rhinitis: saline nasal spray prn. No recent concerns.     [facility], 1 1/30    Electronically signed by: Estephania Espinosa NP

## 2021-06-20 NOTE — LETTER
Letter by Estephania Espinosa NP at      Author: Estephania Espinosa NP Service: -- Author Type: --    Filed:  Encounter Date: 2020 Status: (Other)         Patient: Sonu Solomon   MR Number: [id number]   YOB: 1927   Date of Visit: 2020                 [facility]    DATE: 2020    NAME:  Sonu Solomon             :  1927  MRN: [id number]  CODE STATUS:  DNR/DNI    VISIT TYPE: Problem Visit (malnutrition, weight loss, cognitive decline)     FACILITY:  [facility] [[id number]]       CHIEF COMPLAIN/REASON FOR VISIT:    Chief Complaint   Patient presents with   ? Problem Visit     malnutrition, weight loss, cognitive decline               HISTORY OF PRESENT ILLNESS: Sonu Solomon is a 89 y.o. male who is a resident of [facility]. He has PMH of HTN, chronic DAVID infection, severe mitral valve prolapse, regurgitation, hyponatremia, aortic stenosis.     Today Mr. Solomon is seen for concerns of malnutrition, weight loss, cognitive decline. His family  Has been very concerned about how he has been doing since the lock down. He seems more confused on the phone and has not been taking his medications. They are worried he is not eating and losing weight, since he required a lot of cuing and reminders for this before and they are not able to provide this anymore. They have been adding nursing services on but still worried about how he is doing. He has not been showering, performing ADLS, changing his clothes, or providing self cares. Nursing reports when they offer to help with these cares he usually refuses. He has been noted to be disheveled and unkempt on appearance from nursing and the resident assistants. On exam today he is seen in his apartment and I called his daughter Aparna Nayak and had her on speakerphone for exam. On appearance Oswald is wearing a dirty shirt, soiled pants. The pants are the same pants he has worn for previous visits but they  are so baggy on him he is wearing suspenders to keep them up. He says he was just about to have lunch but does not have any food out. He says he is eating but is not able to tell me what he had today to eat or what he ate yesterday. He says he is fine. He is not able to tell me how often he drinks his protein shakes and his daughter thinks based on how often they are bringing them in and what is left maybe at most once a day. He says he is taking his meds and Aparna Nayak reminds him he has not been taking them. He says he is fine. Aparna Nayak says he just started therapy last week and this is helpful because he likes the interaction and is walking more. Before this he was not walking much or getting much physical activity. He was weaker before he started therapy as well. He denies any headaches or pain. He does say his butt does still hurt on occasion but is using his cushions. Aparna Nayak requests his bottom be looked at today because she is worried he is not changing the bandage and if the area has developed into a wound or is progressing. Aparna nayak notes he has been slurring his speech at times on the phone and she and her sisters are worried he may  Be having TIAs. We discussed the risks v benefits of treating this. We discussed starting him on aspirin would increase risk of bleeding and not provide much immediate benefits. Starting a statin is more of a 10 year outlook, which we do not expect his life expectancy to be that long. Aparna Nayak says she and her family cry every day and are very worried about him. They feel he has really declined and are interested in discussing hospice. They know if he becomes sick he doesn't have a chance to fight it off with his DAVID and they do not want him to go to the hospital for any reason. They would like him to be comfortable and die in his apartment/home. Discussed with her that he may be difficult to qualify based on diagnosis, but will put in the consult and see what hospice says. She  says they do not have a preference as to what company it is. She agreed to [facility] hospice. Oswald fell asleep while speaking with Aparna Nayak and mentioned this to her and she says that he has been doing this a lot more frequently as well. He seems to have physically declined. His last weight was 96lbs but that was several weeks ago and Aparna Nayak and I discussed this is a weight loss but he looks like he has lost more. I will ask staff to weigh him again today. Aparna Nayak appreciated the visit and phone discussion. Called [facility] and notified them of consult. I explained his recent decline, medical history, concerns for cognitive decline, weight loss, malnutrition. I then updated nursing and director of nursing at [facility] of consult.      REVIEW OF SYSTEMS:  PROBLEMS AND REVIEW OF SYSTEMS:   Today on ROS:   Currently, no fever, chills, or rigors. Decreased vision, hearing intact. Denies any chest pain, headaches, palpitations, lightheadedness, dizziness, shortness of breath.  Positive for forgetfulness, urinary frequency, nocturia, incontinence at times but controlled, pain in bottom intermittent, pain in right arm intermittent, increased confusion today, weight loss, unable to obtain further ROS due to cognition    Allergies   Allergen Reactions   ? Hydrocodone-Acetaminophen Nausea Only   ? Tree And Shrub Pollen Other (See Comments)     Sneezing from evergreen tree pollen in winter.     Current Outpatient Medications   Medication Sig   ? acetaminophen (TYLENOL) 500 MG tablet Take 1,000 mg by mouth 3 (three) times a day.   ? carvediloL (COREG) 3.125 MG tablet 1 TAB BY MOUTH TWICE DAILY   ? cyanocobalamin (VITAMIN B-12) 500 MCG tablet Take 500 mcg by mouth daily.   ? oxybutynin (DITROPAN XL) 10 MG ER tablet Take 10 mg by mouth at bedtime.      Past Medical History:    Past Medical History:   Diagnosis Date   ? Arrhythmia     PAC's   ? Hypertension    ? Valvular disease     MVP with  mod-severe MR           PHYSICAL EXAMINATION  Vitals:    05/15/20 0700   BP: 120/66   Pulse: (!) 59   Resp: 13   Temp: 97.1  F (36.2  C)   SpO2: 95%       Today on physical exam:     GENERAL: Awake at times but dozes during conversation, not in any form of acute distress, answers only few questions appropriately, follows simple commands,  small frame, very cachectic today, unkempt, poor hygiene, stained clothes  HEENT: Head is normocephalic with normal hair distribution. No evidence of trauma. Ears: No acute purulent discharge. Eyes: Conjunctivae pink with no scleral jaundice. Nose: Normal mucosa and septum. NECK: Supple with no cervical or supraclavicular lymphadenopathy. Trachea is midline. [place], glasses  CHEST: No tenderness or deformity, no crepitus  LUNG: dim to auscultation with good chest expansion. There are no crackles or wheezes, normal AP diameter.  BACK: mild kyphosis of the thoracic spine. Symmetric, no curvature, ROM normal, no CVA tenderness, no spinal tenderness   CVS: There is good S1  S2, regular rhythm, murmur present,  2+ pulses symmetric in all extremities.  ABDOMEN: concave and soft, nontender to palpation, non distended, no masses, no organomegaly, good bowel sounds, no rebound or guarding, no peritoneal signs.   EXTREMITIES: No pedal edema, Atraumatic. Full range of motion on both upper and lower extremities, there is no tenderness to palpation, no cyanosis or clubbing, no calf tenderness.  Pulses equal in all extremities, normal cap refill, no joint swelling.   SKIN: Warm and dry, no erythema noted.  Examined coccyx region and now has stage 1 pressure injury, non blanchable erythema, noted to have multiple pairs of underwear on and are stained with urine and stool  NEUROLOGICAL: very confused today, forgetful. Repeats self frequently. Intermittent slurred speech, no facial droop, no aphasia noted, no arm drift or unilateral weakness, significant cognitive decline          LABS:   No results  found for this or any previous visit (from the past 168 hour(s)).  Results for orders placed or performed during the hospital encounter of 01/30/20   Basic Metabolic Panel   Result Value Ref Range    Sodium 143 136 - 145 mmol/L    Potassium 4.6 3.5 - 5.0 mmol/L    Chloride 105 98 - 107 mmol/L    CO2 26 22 - 31 mmol/L    Anion Gap, Calculation 12 5 - 18 mmol/L    Glucose 128 (H) 70 - 125 mg/dL    Calcium 9.4 8.5 - 10.5 mg/dL    BUN 36 (H) 8 - 28 mg/dL    Creatinine 1.07 0.70 - 1.30 mg/dL    GFR MDRD Af Amer >60 >60 mL/min/1.73m2    GFR MDRD Non Af Amer >60 >60 mL/min/1.73m2         Lab Results   Component Value Date    WBC 6.7 01/30/2020    HGB 13.5 (L) 01/30/2020    HCT 43.0 01/30/2020    MCV 93 01/30/2020     01/30/2020       Lab Results   Component Value Date    [id number] 657 07/19/2019     Lab Results   Component Value Date    HGBA1C 6.1 01/30/2020     Lab Results   Component Value Date    INR 1.05 03/13/2018     No results found for: [id number]  Lab Results   Component Value Date    TSH 2.04 05/28/2015           ASSESSMENT/PLAN:    Severe protein-calorie malnutrition, weight loss: most recent documented weights [phone]. Requested staff weigh him today suspect further weight loss, clothes very loose fitting, needing suspenders to keep pants on. Encourage protein shakes multiple times per day. Unclear how often he is drinking these.   Severe cognitive decline, Alzheimer's dementia, self neglect: concern for self neglect, poor hygiene, unkempt appearance. Dirty clothing, refusing showers, forgetting he has not performed ADLs and refusing for staff to assist with these. Increasing services in [facility] but often refusing assistance.   TIAs: intermittent slurred speech, no aphasia, facial droop noted, no unilateral weakness. Concerns for increased confusion. Discussed risk v benefit of treatment and will defer at this time. Hospice consult.   Stage 1 Pressure injury of coccyx: continue to use pressure  offloading cushions for chairs and walker, suspect not remembering to use these. Foam dressing to coccyx but unclear how often being changed. Changed today on exam. Now non blanchable erythema.    Chronic bronchiectasis, chronic DAVID: no recent exacerbations or concerns.    HTN: SBP 120s. On coreg. Stable today.   Aortic stenosis, severe mitral valve prolapse, regurgitation: On coreg. No recent complaints of chest pain, shortness of breath, but poor historian and lives alone.   Overactive bladder: On oxybutynin. Unclear how stable this is as he is poor historian. Stained underwear.   Vitamin b 12 deficiency: On vitamin b12.  Hospice consult, failure to thrive, malnutrition, end stage Dementia: severe cognitive decline, self neglect, weight loss, malnutrition. Discussed with family, would like to avoid hospitalization if becomes ill. Discussed case with hospice who will review and determine if eligible. Requested staff weigh him today. Supportive cares.     Electronically signed by: Estephania Espinosa NP       Total floor/unit time spent 68 min with >50% time spent on counseling and coordination of care. Counseling regarding malnutrition, cognitive decline. Coordinated care with nursing, hospice for malnutrition, dementia management and hospice consult.

## 2021-06-20 NOTE — LETTER
Letter by Estephania Espinosa NP at      Author: Estephania Espinosa NP Service: -- Author Type: --    Filed:  Encounter Date: 6/10/2020 Status: (Other)         Patient: Sonu Solomon   MR Number: [id number]   YOB: 1927   Date of Visit: 6/10/2020                 [facility]    DATE: 6/10/2020    NAME:  Sonu Solomon             :  1927  MRN: [id number]  CODE STATUS:  DNR/DNI    VISIT TYPE: Problem Visit (hospice admisison, malnutrition, falls)     FACILITY:  [facility] [[id number]]       CHIEF COMPLAIN/REASON FOR VISIT:    Chief Complaint   Patient presents with   ? Problem Visit     hospice admisison, malnutrition, falls               HISTORY OF PRESENT ILLNESS: Sonu Solomon is a 89 y.o. male who is a resident of [facility]. He has PMH of HTN, chronic DAVID infection, severe mitral valve prolapse, regurgitation, hyponatremia, aortic stenosis.     Today Mr. Solomon is seen for concerns of falls, malnutrition, hospice admission. Nursing staff report he was recently admitted to [facility] hospice. He is seen in his apartment alone today. He was sleeping in his recliner and awoken for visit. He says he has not had any falls recently. He has no headache or pain. His bowels are fine. He says he ate breakfast and lunch already today. He is not tired and feels fine. He is noted to be disheveled and his clothes are dirty and stained. His breakfast is still sitting on his kitchen table and I tried to get him to eat something or drink something but he refused. He appears very tired today and irritable, when usually he is pleasant. He then becomes fixated on his cane and demanding this be found immediately. He had two canes in his kitchen but reported these were not the ones he needed. He even raised his voice at this time. He appears significantly cognitively impaired today and did not seem to recognize me. Nursing staff report they continue to try to  encourage him to eat but he is eating very little. His blood pressures have been low at times 70s-100s. They did try using a pediatric cuff on him and these readings were higher than with the adult cuff. He has not complained of dizziness but has had a few falls . He does complain of pain at times to staff. Called daughter Aparna Nayak and updated on visit today. We reviewed his medications and she would like to stop everything except for his pain and comfort meds. She reported having arranged visiting angels to come in and provide 24 hour care starting today at 1pm. She was hoping someone could come this morning but was told they did not have someone earlier than 1pm. She says the family came and visited him outside on Sunday for his birthday and then the children came yesterday again. She says they have referrals out to two hospice homes [facility] and [facility]. They recently found out that he can have two family members with him at [facility] so they are going to hold out for this hospice home. They want as many family with him at the end as possible. They are going to do the 24 hour care in the facility until he can transfer there. She was told that he had to have a covid test in order to go here and he was tested and was negative, but then she was told by [facility] that since the family was with  Him yesterday and the day before he needs another test in order to be cleared to go. She is wondering if the facility can do this today. She doesn't think he will be going to [facility] for another several days to week. She says their waiting list is longer. She is asking about scheduling pain medicine for him as he will not ask for this. We did discuss this and will schedule dilaudid and keep prn as well. She says when the family saw him they were upset and feel that he looks defeated. She is wondering how long he may have and if they should tell him that he will be moving or wait until the day of. I explained that he was agitated  today and irritable and very confused so would wait until closer to when he will be moving as it may contribute to confusion for him. I offered support over the phone and she appreciated the phone call and update.     Called [facility] hospice and discussed plan of care with Eloisa RN and Kody RN. Discussed plan of care with Assisted living nursing staff as well.     REVIEW OF SYSTEMS:  PROBLEMS AND REVIEW OF SYSTEMS:   Today on ROS:   Currently, no fever, chills, or rigors. Decreased vision, hearing intact. Denies any chest pain. Positive for forgetfulness, urinary frequency, nocturia, incontinence at times but controlled, pain in bottom intermittent, pain in right arm intermittent, confusion, weight loss, poor hygiene, poor intake, falls recently, left forehead abrasions now scabbed, steri strips fallen off, weakness, unable to obtain further ROS due to cognition    Allergies   Allergen Reactions   ? Hydrocodone-Acetaminophen Nausea Only   ? Tree And Shrub Pollen Other (See Comments)     Sneezing from evergreen tree pollen in winter.     Current Outpatient Medications   Medication Sig   ? acetaminophen (TYLENOL) 500 MG tablet Take 1,000 mg by mouth 3 (three) times a day.   ? atropine 1 % ophthalmic solution Place 2 drops under the tongue every 4 (four) hours as needed (secretions). Indications: secretions   ? bisacodyL (DULCOLAX) 10 mg suppository Insert 10 mg into the rectum daily as needed (constipation). Indications: constipation   ? haloperidol (HALDOL) 0.5 mg solutab Place 0.5 mg under the tongue every 4 (four) hours as needed for agitation or nausea. Indications: agiataion, nausea   ? HYDROmorphone (DILAUDID) 0.5 MG solutab Place 0.5 mg under the tongue every 2 (two) hours as needed for dyspnea or pain. Indications: hospice care   ? HYDROmorphone (DILAUDID) 0.5 MG solutab Place 0.5 mg under the tongue every 6 (six) hours. Indications: pain/dyspnea   ? LORazepam (ATIVAN) 0.5 MG tablet Take 0.5 mg by mouth  every 4 (four) hours as needed for anxiety. Indications: anxious   ? senna-docusate (PERICOLACE) 8.6-50 mg tablet Take 1 tablet by mouth daily. Indications: constipation     Past Medical History:    Past Medical History:   Diagnosis Date   ? Arrhythmia     PAC's   ? Hypertension    ? Valvular disease     MVP with mod-severe MR           PHYSICAL EXAMINATION  Vitals:    06/10/20 0700   BP: 110/70   Pulse: 82   Resp: 16   Temp: 97  F (36.1  C)       Today on physical exam:     GENERAL: Lethargic, follows simple commands,  small frame, very cachectic today, unkempt, poor hygiene, stained clothes, irritable today, more confused  HEENT: Head is normocephalic with normal hair distribution. No evidence of trauma. Ears: No acute purulent discharge. Eyes: Conjunctivae pink with no scleral jaundice. Nose: Normal mucosa and septum. NECK: Supple with no cervical or supraclavicular lymphadenopathy. Trachea is midline. [place], glasses, left eyebrow abrasion now scabbed, left face and forehead scabbed  CHEST: No tenderness or deformity, no crepitus  LUNG: dim to auscultation with good chest expansion. There are no crackles or wheezes, normal AP diameter.  BACK: mild kyphosis of the thoracic spine. Symmetric, no curvature, ROM normal, no CVA tenderness, no spinal tenderness   CVS: There is good S1  S2, regular rhythm, murmur present,  2+ pulses symmetric in all extremities.  ABDOMEN: concave and soft, nontender to palpation, non distended, no masses, no organomegaly, good bowel sounds, no rebound or guarding, no peritoneal signs.   EXTREMITIES: No pedal edema, Atraumatic. Full range of motion on both upper and lower extremities, there is no tenderness to palpation, no cyanosis or clubbing, no calf tenderness.  Pulses equal in all extremities, normal cap refill, no joint swelling.   SKIN: Warm and dry, no erythema noted.  Examined coccyx region not examined today, left hand two small skin tears healing, scattered  ecchymosis  NEUROLOGICAL: confused, forgetful. Repeats self at times. Speech clear, no facial droop, no aphasia noted, no arm drift or unilateral weakness, cognitive decline but calm, cooperative today          LABS:   Recent Results (from the past 168 hour(s))   COVID-19 Virus PCR MRF    Specimen: Respiratory   Result Value Ref Range    COVID-19 VIRUS SPECIMEN SOURCE Nasopharyngeal     2019-nCOV Not Detected      Results for orders placed or performed during the hospital encounter of 01/30/20   Basic Metabolic Panel   Result Value Ref Range    Sodium 143 136 - 145 mmol/L    Potassium 4.6 3.5 - 5.0 mmol/L    Chloride 105 98 - 107 mmol/L    CO2 26 22 - 31 mmol/L    Anion Gap, Calculation 12 5 - 18 mmol/L    Glucose 128 (H) 70 - 125 mg/dL    Calcium 9.4 8.5 - 10.5 mg/dL    BUN 36 (H) 8 - 28 mg/dL    Creatinine 1.07 0.70 - 1.30 mg/dL    GFR MDRD Af Amer >60 >60 mL/min/1.73m2    GFR MDRD Non Af Amer >60 >60 mL/min/1.73m2         Lab Results   Component Value Date    WBC 6.7 01/30/2020    HGB 13.5 (L) 01/30/2020    HCT 43.0 01/30/2020    MCV 93 01/30/2020     01/30/2020       Lab Results   Component Value Date    [id number] 657 07/19/2019     Lab Results   Component Value Date    HGBA1C 6.1 01/30/2020     Lab Results   Component Value Date    INR 1.05 03/13/2018     No results found for: [id number]  Lab Results   Component Value Date    TSH 2.04 05/28/2015           ASSESSMENT/PLAN:    Hospice consult, failure to thrive, malnutrition, end stage Dementia: severe cognitive decline, self neglect, weight loss, malnutrition, weakness, physical decline. Requiring more assist with ADLs and functioning. Frequent falls recently. Hospice now admitted and following. Dc coreg, azo, oxybutynin, b12 today. Dilaudid scheduled 0.5mg q6h and q2h prn. Daughter requested to keep tylenol scheduled as well. Very minimal intake, family arranged for [facility] 24 hours. Working on hospice home placement. covid-19 pcr test ordered  for today. Supportive cares for family, very tight knit family and will be challenging process for them. Oswald has much support from his family  And would benefit from being with them as much as possible at the end of his life.   Frequent falls: scattered ecchymosis, few falls in last week. Now will have 24 hour care starting today. Worsening confusion and persistent physical and cognitive decline. Supportive cares. Pain from falls. Changed dilaudid orders today.   Severe protein-calorie malnutrition, weight loss: most recent documented weights [id number] but no recent weight. Continues to have very little oral intake. Staff encouraging but is confused and tells staff he already ate when he has not. Will benefit from hospice services and 24 hour care.   Severe cognitive decline, Alzheimer's dementia, self neglect: concern for self neglect, poor hygiene, unkempt appearance. Dirty clothing, refusing showers, forgetting he has not performed ADLs and refusing for staff to assist with these. Nursing reports hospice aid did try to bathe him the other day and he did refuse. Now has 24 hour care starting, which he will benefit from. Hospice following as well. Supportive cares.   TIAs: no recent concerns, conservative measures.   Stage 1 Pressure injury of coccyx: continue to use pressure offloading cushions for chairs and walker, suspect not remembering to use these. Foam dressing to coccyx.   Chronic bronchiectasis, chronic DAVID: no recent exacerbations or concerns.    HTN: SBP 70-100s. On coreg. Will dc coreg due to hypotension, poor intake, dehydration. Minimal cardiac benefit and on hospice. Comfort focused treatments at this time.   Aortic stenosis, severe mitral valve prolapse, regurgitation: stopped coreg.   Overactive bladder: discussed with rebeca and stopped as feel this is of little benefit and given poor intake unnecessary and can contribute to confusion.   Vitamin b 12 deficiency: stopped supplement.     May  transfer to hospice home when arrangements finalized with current meds and treatments.     Electronically signed by: Estephania Espinosa NP     Total floor/unit time spent 68 min with >50% time spent on counseling and coordination of care. Counseling regarding malnutrition management, med reductions, pain management. Coordinated care with nursing, hospice for care management, pain management, malnutrition management.

## 2021-06-25 NOTE — PROGRESS NOTES
Progress Notes by Lainey Kaur MD at 6/7/2017  7:50 AM     Author: Lainey Kaur MD Service: -- Author Type: Physician    Filed: 6/9/2017  9:48 AM Encounter Date: 6/7/2017 Status: Signed    : Lainey Kaur MD (Physician)           Click to link to [facility] HEART CARE NOTE    Thank you, Dr. Brito, for asking the [facility] Heart Care team to see Mr. Sonu Solomon to evaluate mitral regurgitation ..    Assessment/Recommendations   Assessment:    1. Mitral valve prolapse with moderate to severe mitral regurgitation, essentially asymptomatic.  Patient has noted some slight increase in exertional dyspnea although this may also be related to recent abnormal findings on chest CT which could suggest atypical TB, although a malignancy cannot be excluded.  At this point, would continue with current vasodilator therapy.  We could repeat an echocardiogram.  He might be a candidate for mitral clip procedure to minimize the degree of mitral regurgitation.  2.  Irregular heart rhythm with previous documented PACs.  Will repeat an ECG today as it is possible he has gone into atrial fibrillation.  If the A. fib is present, I would tend to consider anticoagulation although given recent hemoptysis, will hold for now until further diagnostic evaluation of his pulmonary issues has been completed.  3.  Recent hemoptysis and abnormal chest CT.  Again this is suggestive of atypical TB and the patient has been referred to infectious disease.  Will await their recommendations.  4.  Unintentional weight loss.  This could be related to atypical TB or underlying malignancy.  I have suggested he drink some boost or Ensure between meals to try to put on some additional calories.    Plan:  1.  Continue current vasodilator therapy  2.  Check ECG today and arrange for echocardiogram at his convenience  3.  Follow-up with infectious disease as scheduled       History of Present Illness    Mr. Sonu Solomon is a  89 y.o. male with documented mitral valve prolapse with moderate to severe mitral regurgitation which we have been treating medically, frequent PACs documented on prior ECGs who returns today for routine yearly visit.  He has continued to lose weight despite good eating habits.  He is down 10 pounds from his visit last year.  He recently had 2 episodes of hemoptysis prompting a visit to the [facility].  A chest CT was performed which demonstrates nodular infiltrates in both lungs which may be consistent with atypical TB.  This could explain his weight loss.  Malignancy cannot be excluded.  He is currently scheduled to be seen by  infectious disease in the next 1-2 weeks.  He denies any orthopnea, PND or lower extremity edema.  He is still able to walk approximately 1 mile without any significant dyspnea but does note becoming a little more winded recently.    ECG (personally reviewed): ECG today demonstrates sinus rhythm with frequent PACs and couplets    ECHO (personally reviewed): No recent echo      Physical Examination Review of Systems   Vitals:    06/07/17 0736   BP: 140/82   Pulse: 80   Resp: 18     Body mass index is 17.75 kg/(m^2).  Wt Readings from Last 3 Encounters:   06/07/17 110 lb (49.9 kg)   05/22/17 115 lb (52.2 kg)   05/10/16 120 lb (54.4 kg)     General Appearance:   Awake, Alert, No acute distress.   HEENT:  No scleral icterus; the mucous membranes were pink and moist.   Neck: No cervical bruits or jugular venous distention    Chest: The spine was straight. The chest was symmetric.   Lungs:   Respirations unlabored; the lungs are clear to auscultation. No wheezing   Cardiovascular:    irregular rhythm.  S1, S2 normal.  3/6 holosystolic murmur heard along the lower left sternal border into the axilla.  No S3 gallop.     Abdomen:  No organomegaly, masses, bruits, or tenderness. Bowels sounds are present   Extremities:  no peripheral edema    Skin: No xanthelasma. Warm, Dry.    Musculoskeletal: No tenderness.   Neurologic: Mood and affect are appropriate.    General: Weight Loss  Eyes: WNL  Ears/Nose/Throat: WNL  Lungs: Cough, Shortness of Breath  Heart: Shortness of Breath with activity  Stomach: WNL  Bladder: Frequent Urination at Night  Muscle/Joints: Muscle Weakness, Muscle Pain  Skin: WNL  Nervous System: Dizziness, Loss of Balance  Mental Health: Confusion     Blood: Easy Bleeding     Medical History  Surgical History Family History Social History   No past medical history on file. No past surgical history on file. No family history on file. Social History     Social History   ? Marital status:      Spouse name: N/A   ? Number of children: N/A   ? Years of education: N/A     Occupational History   ? Not on file.     Social History Main Topics   ? Smoking status: Former Smoker   ? Smokeless tobacco: Not on file   ? Alcohol use Not on file   ? Drug use: Not on file   ? Sexual activity: Not on file     Other Topics Concern   ? Not on file     Social History Narrative          Medications  Allergies   Current Outpatient Prescriptions   Medication Sig Dispense Refill   ? aspirin 81 mg chewable tablet Chew 81 mg daily.     ? carvedilol (COREG) 6.25 MG tablet TAKE 1 TABLET TWICE DAILY WITH MEALS 180 tablet 0   ? cyanocobalamin (VITAMIN B-12) 500 MCG tablet Take 500 mcg by mouth daily.     ? lisinopril (PRINIVIL,ZESTRIL) 5 MG tablet TAKE 1 TABLET EVERY DAY 90 tablet 2   ? multivitamin with minerals (THERA-M) 9 mg iron-400 mcg Tab tablet Take 1 tablet by mouth daily.       No current facility-administered medications for this visit.       Allergies   Allergen Reactions   ? Hydrocodone-Acetaminophen Nausea Only   ? Tree And Shrub Pollen Other (See Comments)     Sneezing from evergreen tree pollen in winter.         Lab Results    Chemistry/lipid CBC Cardiac Enzymes/BNP/TSH/INR   Lab Results   Component Value Date    CREATININE 0.85 05/22/2017    BUN 20 05/22/2017    K 4.8 05/22/2017      05/22/2017     05/22/2017    CO2 26 05/22/2017    Lab Results   Component Value Date    WBC 5.5 05/22/2017    HGB 14.0 05/22/2017    HCT 41.7 05/22/2017    MCV 89 05/22/2017     05/22/2017    Lab Results   Component Value Date    TSH 2.04 05/28/2015

## 2021-06-26 NOTE — PROGRESS NOTES
Progress Notes by Lainey Kaur MD at 6/1/2018  8:50 AM     Author: Lainey Kaur MD Service: -- Author Type: Physician    Filed: 6/1/2018  9:31 AM Encounter Date: 6/1/2018 Status: Signed    : Lainey Kaur MD (Physician)           Click to link to [facility] HEART CARE NOTE    Thank you, Dr. Brito, for asking the [facility] Heart Care team to see Mr. Sonu Solomon to follow-up on his mitral valve disease and atrial dysrhythmia.    Assessment/Recommendations   Assessment:    1.  Mitral valve prolapse with moderate to severe mitral regurgitation.  This had remained fairly stable based on his last echocardiogram in June 2017.  Again he has not been interested in pursuing more aggressive forms of treatment although I did mention the Karissa clip which is a catheter-based device design for treatment of mitral valve prolapse and regurgitation.  He is uncertain whether he would like to go through that type of procedure.  I am also concerned about his continued weight loss, DAVID infection and intermittent hemoptysis.  At this point, will continue with medical therapy.  I did suggest decreasing his lisinopril to 2.5 mg daily as he does note more unsteadiness on his feet which may be due to some mild orthostatic falls and blood pressure.  2.  Premature atrial contractions.  This is been documented on previous ECGs.  No clear evidence of atrial fibrillation although he is at a higher risk for this rhythm development.  3.  Mild weight loss.  He tells me he eats 3 good meals a day.  I encouraged him to drink Boost or Ensure between meals which may help.  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  Cannot exclude underlying malignancy given his past history of pipe smoking.  Again encouraged him to follow-up with his primary physician as well.      Plan:  1.  Continue current dose of carvedilol but decrease lisinopril to 2.5 mg daily  2.  Follow-up with   Daryl regarding concerns on confusion  3.  Follow-up with me in 1 year     History of Present Illness    Mr. Sonu Solomon is a 89 y.o. male with history of mitral valve prolapse with moderate to severe mitral regurgitation, frequent PACs and essential hypertension who returns to the office today for routine visit.  He has done fairly well over the course of the year with only mild increase in his symptoms of exertional dyspnea.  He continues to deny orthopnea, PND or lower extremity edema.  He does check his blood pressure about once a month and has noted a couple times where the monitor stated he had an irregular rhythm.  I did remind him that he has had frequent PACs show up on EKG.  No symptoms of lightheadedness or near syncope.  He does report feeling unsteady on his feet when he stands up but has not had any falls.    ECG (personally reviewed): No ECG today    Cardiac Imaging Studies (personally reviewed): No recent imaging.  Last echocardiogram in June 2017 demonstrating normal left ventricular systolic function with an ejection fraction was 60%, mitral valve prolapse with moderate mitral regurgitation     Physical Examination Review of Systems   Vitals:    06/01/18 0851   BP: 116/70   Pulse: 80   Resp: 20     Body mass index is 17.56 kg/(m^2).  Wt Readings from Last 3 Encounters:   06/01/18 108 lb 12.8 oz (49.4 kg)   03/13/18 111 lb (50.3 kg)   12/17/17 110 lb (49.9 kg)     General Appearance:   Awake, Alert, No acute distress.   HEENT:  No scleral icterus; the mucous membranes were pink and moist.   Neck: No cervical bruits or jugular venous distention    Chest: The spine was straight. The chest was symmetric.   Lungs:   Respirations unlabored; the lungs are clear to auscultation. No wheezing   Cardiovascular:    Slightly irregular rhythm.  S1, S2 normal.  2-3/6 blowing holosystolic murmur heard from the apex into the axilla.  No S3 gallop.   Abdomen:  No organomegaly, masses, bruits, or tenderness.  Bowels sounds are present   Extremities:  No peripheral edema bilaterally   Skin: No xanthelasma. Warm, Dry.   Musculoskeletal: No tenderness.   Neurologic: Mood and affect are appropriate.    General: WNL  Eyes: WNL  Ears/Nose/Throat: WNL  Lungs: WNL  Heart: Irregular Heartbeat  Stomach: WNL  Bladder: Frequent Urination at Night  Muscle/Joints: WNL  Skin: WNL  Nervous System: Daytime Sleepiness  Mental Health: WNL     Blood: WNL     Medical History  Surgical History Family History Social History   Past Medical History:   Diagnosis Date   ? Arrhythmia     PAC's   ? Hypertension    ? Valvular disease     MVP with mod-severe MR    Past Surgical History:   Procedure Laterality Date   ? APPENDECTOMY Right    ? INCISIONAL HERNIA REPAIR Left 05/23/2008    Surgeon Dr. Brandon at [facility].    No family history on file. Social History     Social History   ? Marital status:      Spouse name: N/A   ? Number of children: N/A   ? Years of education: N/A     Occupational History   ? Not on file.     Social History Main Topics   ? Smoking status: Former Smoker     Types: Pipe   ? Smokeless tobacco: Never Used   ? Alcohol use Not on file   ? Drug use: No   ? Sexual activity: Not on file     Other Topics Concern   ? Not on file     Social History Narrative          Medications  Allergies   Current Outpatient Prescriptions   Medication Sig Dispense Refill   ? aspirin 81 mg chewable tablet Chew 81 mg daily.     ? carvedilol (COREG) 6.25 MG tablet Take 1 tablet (6.25 mg total) by mouth 2 (two) times a day with meals. 180 tablet 3   ? cyanocobalamin (VITAMIN B-12) 500 MCG tablet Take 500 mcg by mouth daily.     ? lisinopril (PRINIVIL,ZESTRIL) 2.5 MG tablet Take 1 tablet (2.5 mg total) by mouth daily. 90 tablet 3   ? multivitamin with minerals (THERA-M) 9 mg iron-400 mcg Tab tablet Take 1 tablet by mouth daily.       No current facility-administered medications for this visit.       Allergies   Allergen Reactions   ?  Hydrocodone-Acetaminophen Nausea Only   ? Tree And Shrub Pollen Other (See Comments)     Sneezing from evergreen tree pollen in winter.         Lab Results    Chemistry/lipid CBC Cardiac Enzymes/BNP/TSH/INR   Lab Results   Component Value Date    CREATININE 0.79 03/13/2018    BUN 25 03/13/2018    K 4.5 03/13/2018     03/13/2018     03/13/2018    CO2 28 03/13/2018    Lab Results   Component Value Date    WBC 5.8 03/13/2018    HGB 13.1 (L) 03/13/2018    HCT 40.2 03/13/2018    MCV 91 03/13/2018     03/13/2018    Lab Results   Component Value Date    TSH 2.04 05/28/2015    INR 1.05 03/13/2018

## 2021-07-03 NOTE — ADDENDUM NOTE
Addendum Note by Ira Smallwood at 6/26/2017 11:33 AM     Author: Ira Smallwood Service: -- Author Type:     Filed: 6/26/2017 11:33 AM Encounter Date: 6/16/2017 Status: Signed    : Ira Smallwood ()    Addended by: IRA SMALLWOOD on: 6/26/2017 11:33 AM        Modules accepted: Orders

## 2021-07-03 NOTE — ADDENDUM NOTE
Addendum Note by Crescencio Da Silva MD at 5/23/2017  1:57 PM     Author: Crescencio Da Silva MD Service: -- Author Type: Physician    Filed: 5/23/2017  1:57 PM Encounter Date: 5/22/2017 Status: Signed    : Crescencio Da Silva MD (Physician)    Addended by: CRESCENCIO DA SILVA on: 5/23/2017 01:57 PM        Modules accepted: Orders

## 2021-07-03 NOTE — ADDENDUM NOTE
Addendum Note by Shana Suárez at 6/22/2017  1:23 PM     Author: Shana Suárez Service: -- Author Type:     Filed: 6/22/2017  1:23 PM Encounter Date: 6/16/2017 Status: Signed    : Shana Suárez ()    Addended by: SHANA SUÁREZ on: 6/22/2017 01:23 PM        Modules accepted: Orders

## 2021-08-03 PROBLEM — E44.0 MODERATE PROTEIN-CALORIE MALNUTRITION (H): Status: RESOLVED | Noted: 2019-01-01 | Resolved: 2020-01-01
